# Patient Record
Sex: FEMALE | Race: WHITE | NOT HISPANIC OR LATINO
[De-identification: names, ages, dates, MRNs, and addresses within clinical notes are randomized per-mention and may not be internally consistent; named-entity substitution may affect disease eponyms.]

---

## 2017-01-24 ENCOUNTER — APPOINTMENT (OUTPATIENT)
Dept: INTERNAL MEDICINE | Facility: CLINIC | Age: 37
End: 2017-01-24

## 2017-01-24 VITALS
DIASTOLIC BLOOD PRESSURE: 70 MMHG | WEIGHT: 192 LBS | HEIGHT: 68 IN | OXYGEN SATURATION: 98 % | BODY MASS INDEX: 29.1 KG/M2 | TEMPERATURE: 98.2 F | RESPIRATION RATE: 14 BRPM | SYSTOLIC BLOOD PRESSURE: 116 MMHG | HEART RATE: 93 BPM

## 2017-02-20 ENCOUNTER — RX RENEWAL (OUTPATIENT)
Age: 37
End: 2017-02-20

## 2017-05-15 ENCOUNTER — MOBILE ON CALL (OUTPATIENT)
Age: 37
End: 2017-05-15

## 2017-08-14 ENCOUNTER — RX RENEWAL (OUTPATIENT)
Age: 37
End: 2017-08-14

## 2017-11-20 ENCOUNTER — RX RENEWAL (OUTPATIENT)
Age: 37
End: 2017-11-20

## 2017-12-17 ENCOUNTER — TRANSCRIPTION ENCOUNTER (OUTPATIENT)
Age: 37
End: 2017-12-17

## 2018-02-09 ENCOUNTER — RX RENEWAL (OUTPATIENT)
Age: 38
End: 2018-02-09

## 2018-05-07 ENCOUNTER — RX RENEWAL (OUTPATIENT)
Age: 38
End: 2018-05-07

## 2018-07-31 ENCOUNTER — RX RENEWAL (OUTPATIENT)
Age: 38
End: 2018-07-31

## 2018-10-24 ENCOUNTER — RX RENEWAL (OUTPATIENT)
Age: 38
End: 2018-10-24

## 2019-01-23 ENCOUNTER — RX RENEWAL (OUTPATIENT)
Age: 39
End: 2019-01-23

## 2019-01-23 ENCOUNTER — TRANSCRIPTION ENCOUNTER (OUTPATIENT)
Age: 39
End: 2019-01-23

## 2019-02-02 ENCOUNTER — TRANSCRIPTION ENCOUNTER (OUTPATIENT)
Age: 39
End: 2019-02-02

## 2019-04-15 ENCOUNTER — RX RENEWAL (OUTPATIENT)
Age: 39
End: 2019-04-15

## 2019-04-29 ENCOUNTER — APPOINTMENT (OUTPATIENT)
Dept: INTERNAL MEDICINE | Facility: CLINIC | Age: 39
End: 2019-04-29
Payer: COMMERCIAL

## 2019-04-29 VITALS
TEMPERATURE: 97.8 F | WEIGHT: 190 LBS | OXYGEN SATURATION: 98 % | SYSTOLIC BLOOD PRESSURE: 118 MMHG | RESPIRATION RATE: 16 BRPM | HEIGHT: 68 IN | HEART RATE: 62 BPM | BODY MASS INDEX: 28.79 KG/M2 | DIASTOLIC BLOOD PRESSURE: 80 MMHG

## 2019-04-29 DIAGNOSIS — Z82.49 FAMILY HISTORY OF ISCHEMIC HEART DISEASE AND OTHER DISEASES OF THE CIRCULATORY SYSTEM: ICD-10-CM

## 2019-04-29 DIAGNOSIS — Z80.3 FAMILY HISTORY OF MALIGNANT NEOPLASM OF BREAST: ICD-10-CM

## 2019-04-29 DIAGNOSIS — L08.9 SEBACEOUS CYST: ICD-10-CM

## 2019-04-29 DIAGNOSIS — Z80.1 FAMILY HISTORY OF MALIGNANT NEOPLASM OF TRACHEA, BRONCHUS AND LUNG: ICD-10-CM

## 2019-04-29 DIAGNOSIS — Z78.9 OTHER SPECIFIED HEALTH STATUS: ICD-10-CM

## 2019-04-29 DIAGNOSIS — Z80.0 FAMILY HISTORY OF MALIGNANT NEOPLASM OF DIGESTIVE ORGANS: ICD-10-CM

## 2019-04-29 DIAGNOSIS — L72.3 SEBACEOUS CYST: ICD-10-CM

## 2019-04-29 DIAGNOSIS — M25.50 PAIN IN UNSPECIFIED JOINT: ICD-10-CM

## 2019-04-29 DIAGNOSIS — Z87.09 PERSONAL HISTORY OF OTHER DISEASES OF THE RESPIRATORY SYSTEM: ICD-10-CM

## 2019-04-29 DIAGNOSIS — Z83.49 FAMILY HISTORY OF OTHER ENDOCRINE, NUTRITIONAL AND METABOLIC DISEASES: ICD-10-CM

## 2019-04-29 PROCEDURE — 99213 OFFICE O/P EST LOW 20 MIN: CPT

## 2019-04-29 RX ORDER — FEXOFENADINE HYDROCHLORIDE 180 MG/1
180 TABLET, FILM COATED ORAL DAILY
Qty: 30 | Refills: 0 | Status: ACTIVE | COMMUNITY
Start: 2019-04-29

## 2019-04-29 RX ORDER — AZITHROMYCIN 250 MG/1
250 TABLET, FILM COATED ORAL
Qty: 1 | Refills: 0 | Status: DISCONTINUED | COMMUNITY
Start: 2017-01-24 | End: 2019-04-29

## 2019-04-29 RX ORDER — BENZONATATE 200 MG/1
200 CAPSULE ORAL
Qty: 20 | Refills: 0 | Status: DISCONTINUED | COMMUNITY
Start: 2017-01-24 | End: 2019-04-29

## 2019-04-29 NOTE — PAST MEDICAL HISTORY
[Menstruating] : menstruating [Menarche Age ____] : age at menarche was [unfilled] [Definite ___ (Date)] : the last menstrual period was [unfilled] [Regular Cycle Intervals] : have been regular [Total Preg ___] : G[unfilled] [Live Births ___] : P[unfilled]  [Full Term ___] : Full Term: [unfilled] [Ectopics ___] : ectopic pregnancies: [unfilled]  [FreeTextEntry1] : IUD

## 2019-04-29 NOTE — PLAN
[FreeTextEntry1] : Gastroenterology.\par GERD. Continue with AcipHex 20 mg daily. Followup gastroenterology.\par Advise diet. Avoid large meals and lying down after eating.\par \par Endocrinology.\par Thyroid nodule. Followup with endocrinologist. Repeat thyroid sonogram to compare nodule sizes.\par \par Allergies. Continue with over-the-counter antihistamine

## 2019-04-29 NOTE — HEALTH RISK ASSESSMENT
[No falls in past year] : Patient reported no falls in the past year [0] : 2) Feeling down, depressed, or hopeless: Not at all (0) [] : No [de-identified] : Social [de-identified] : 2 x a week (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) [de-identified] : Regular  [[id number]] : 0

## 2019-04-29 NOTE — PHYSICAL EXAM
[No Acute Distress] : no acute distress [Well Nourished] : well nourished [Well Developed] : well developed [Well-Appearing] : well-appearing [Normal Sclera/Conjunctiva] : normal sclera/conjunctiva [PERRL] : pupils equal round and reactive to light [EOMI] : extraocular movements intact [Normal Outer Ear/Nose] : the outer ears and nose were normal in appearance [Normal Oropharynx] : the oropharynx was normal [No JVD] : no jugular venous distention [Supple] : supple [No Lymphadenopathy] : no lymphadenopathy [Thyroid Normal, No Nodules] : the thyroid was normal and there were no nodules present [No Respiratory Distress] : no respiratory distress  [Clear to Auscultation] : lungs were clear to auscultation bilaterally [No Accessory Muscle Use] : no accessory muscle use [Normal Rate] : normal rate  [Regular Rhythm] : with a regular rhythm [Normal S1, S2] : normal S1 and S2 [No Carotid Bruits] : no carotid bruits [No Abdominal Bruit] : a ~M bruit was not heard ~T in the abdomen [No Varicosities] : no varicosities [Pedal Pulses Present] : the pedal pulses are present [No Edema] : there was no peripheral edema [No Extremity Clubbing/Cyanosis] : no extremity clubbing/cyanosis [No Palpable Aorta] : no palpable aorta [Soft] : abdomen soft [Non Tender] : non-tender [Non-distended] : non-distended [No Masses] : no abdominal mass palpated [No HSM] : no HSM [Normal Bowel Sounds] : normal bowel sounds [Normal Posterior Cervical Nodes] : no posterior cervical lymphadenopathy [Normal Anterior Cervical Nodes] : no anterior cervical lymphadenopathy [No CVA Tenderness] : no CVA  tenderness [No Spinal Tenderness] : no spinal tenderness [No Joint Swelling] : no joint swelling [Grossly Normal Strength/Tone] : grossly normal strength/tone [No Rash] : no rash [Thin Hair] : thin hair [Normal Gait] : normal gait [Coordination Grossly Intact] : coordination grossly intact [No Focal Deficits] : no focal deficits [Deep Tendon Reflexes (DTR)] : deep tendon reflexes were 2+ and symmetric [Speech Grossly Normal] : speech grossly normal [Normal Affect] : the affect was normal [Alert and Oriented x3] : oriented to person, place, and time [Normal Mood] : the mood was normal [Normal Insight/Judgement] : insight and judgment were intact

## 2019-04-29 NOTE — HISTORY OF PRESENT ILLNESS
[FreeTextEntry1] : A 38-year-old female, who presents to the office for renewal of medication for GERD  [de-identified] : A 38-year-old female, with a past medical history as noted below, present to the office for renewal of medication.  Patient denies having chest pain or shortness of breath. Is taking generic AcipHex with good relief of heartburn. Patient denies side effects of medication. Also states she has vitamin D deficiency but has not been taking the vitamin D as directed. Has been seeing endocrinology for evaluation and followup of a thyroid nodule.

## 2019-04-29 NOTE — COUNSELING
[Weight management counseling provided] : Weight management [Healthy eating counseling provided] : healthy eating [Good understanding] : Patient has a good understanding of disease, goals and obesity follow-up plan [Low Fat Diet] : Low fat diet [Decrease Portions] : Decrease food portions [Walking] : Walking [de-identified] : Diet and exercise advised

## 2019-04-29 NOTE — ASSESSMENT
[FreeTextEntry1] : A 38-year-old female, with vitamin D deficiency, hypothyroidism, allergies and acid reflux

## 2019-05-28 ENCOUNTER — APPOINTMENT (OUTPATIENT)
Dept: INTERNAL MEDICINE | Facility: CLINIC | Age: 39
End: 2019-05-28

## 2019-06-06 ENCOUNTER — RX RENEWAL (OUTPATIENT)
Age: 39
End: 2019-06-06

## 2019-06-06 DIAGNOSIS — E56.9 VITAMIN DEFICIENCY, UNSPECIFIED: ICD-10-CM

## 2019-06-12 ENCOUNTER — RESULT CHARGE (OUTPATIENT)
Age: 39
End: 2019-06-12

## 2019-06-13 ENCOUNTER — TRANSCRIPTION ENCOUNTER (OUTPATIENT)
Age: 39
End: 2019-06-13

## 2019-06-13 ENCOUNTER — APPOINTMENT (OUTPATIENT)
Dept: INTERNAL MEDICINE | Facility: CLINIC | Age: 39
End: 2019-06-13
Payer: COMMERCIAL

## 2019-06-13 ENCOUNTER — NON-APPOINTMENT (OUTPATIENT)
Age: 39
End: 2019-06-13

## 2019-06-13 VITALS
SYSTOLIC BLOOD PRESSURE: 100 MMHG | OXYGEN SATURATION: 99 % | DIASTOLIC BLOOD PRESSURE: 82 MMHG | RESPIRATION RATE: 16 BRPM | TEMPERATURE: 98.7 F | WEIGHT: 193.31 LBS | HEART RATE: 72 BPM | HEIGHT: 68 IN | BODY MASS INDEX: 29.3 KG/M2

## 2019-06-13 LAB
25(OH)D3 SERPL-MCNC: 25.1 NG/ML
ALBUMIN SERPL ELPH-MCNC: 4.7 G/DL
ALP BLD-CCNC: 97 U/L
ALT SERPL-CCNC: 12 U/L
ANION GAP SERPL CALC-SCNC: 14 MMOL/L
AST SERPL-CCNC: 11 U/L
BASOPHILS # BLD AUTO: 0.07 K/UL
BASOPHILS NFR BLD AUTO: 0.7 %
BILIRUB SERPL-MCNC: 1 MG/DL
BUN SERPL-MCNC: 17 MG/DL
CALCIUM SERPL-MCNC: 9.9 MG/DL
CHLORIDE SERPL-SCNC: 104 MMOL/L
CHOLEST SERPL-MCNC: 164 MG/DL
CHOLEST/HDLC SERPL: 3.4 RATIO
CO2 SERPL-SCNC: 23 MMOL/L
CREAT SERPL-MCNC: 0.86 MG/DL
CRP SERPL-MCNC: 0.33 MG/DL
EOSINOPHIL # BLD AUTO: 0.17 K/UL
EOSINOPHIL NFR BLD AUTO: 1.7 %
ERYTHROCYTE [SEDIMENTATION RATE] IN BLOOD BY WESTERGREN METHOD: 9 MM/HR
ESTIMATED AVERAGE GLUCOSE: 111 MG/DL
GLUCOSE SERPL-MCNC: 98 MG/DL
HBA1C MFR BLD HPLC: 5.5 %
HCT VFR BLD CALC: 43.7 %
HDLC SERPL-MCNC: 48 MG/DL
HGB BLD-MCNC: 14.2 G/DL
IMM GRANULOCYTES NFR BLD AUTO: 0.6 %
LDLC SERPL CALC-MCNC: 86 MG/DL
LYMPHOCYTES # BLD AUTO: 3.1 K/UL
LYMPHOCYTES NFR BLD AUTO: 31.4 %
MAN DIFF?: NORMAL
MCHC RBC-ENTMCNC: 29 PG
MCHC RBC-ENTMCNC: 32.5 GM/DL
MCV RBC AUTO: 89.4 FL
MONOCYTES # BLD AUTO: 0.85 K/UL
MONOCYTES NFR BLD AUTO: 8.6 %
NEUTROPHILS # BLD AUTO: 5.63 K/UL
NEUTROPHILS NFR BLD AUTO: 57 %
PLATELET # BLD AUTO: 329 K/UL
POTASSIUM SERPL-SCNC: 4.5 MMOL/L
PROT SERPL-MCNC: 6.9 G/DL
RBC # BLD: 4.89 M/UL
RBC # FLD: 12.6 %
SODIUM SERPL-SCNC: 141 MMOL/L
T4 FREE SERPL-MCNC: 1.3 NG/DL
TRIGL SERPL-MCNC: 148 MG/DL
TSH SERPL-ACNC: 1.6 UIU/ML
VIT B12 SERPL-MCNC: 530 PG/ML
WBC # FLD AUTO: 9.88 K/UL

## 2019-06-13 PROCEDURE — 99395 PREV VISIT EST AGE 18-39: CPT | Mod: 25

## 2019-06-13 PROCEDURE — 93000 ELECTROCARDIOGRAM COMPLETE: CPT

## 2019-06-13 NOTE — PAST MEDICAL HISTORY
[Menstruating] : menstruating [Menarche Age ____] : age at menarche was [unfilled] [Definite ___ (Date)] : the last menstrual period was [unfilled] [Regular Cycle Intervals] : have been regular [Total Preg ___] : G[unfilled] [Full Term ___] : Full Term: [unfilled] [Live Births ___] : P[unfilled]  [Ectopics ___] : ectopic pregnancies: [unfilled]  [FreeTextEntry1] : IUD

## 2019-06-13 NOTE — PLAN
[FreeTextEntry1] : Cardiopulmonary  - Low HDL     We reviewed and discussed the EKG in full detail.  Reviewed lipid and CRP levels.   Advised what results represented.  Harmeet  was encourage to Continue with her diet and exercise program.   \par  Advised aerobic exercise can increase HDL levels.\par \par Gastroenterology.\par GERD. Continue with AcipHex 20 mg daily. Followup gastroenterology.\par Advise diet. Avoid large meals and lying down after eating.   Reviewed recent labs \par \par Endocrinology.\par Obesity-  Patient is currently on a diet and exercise program. Advised patient to continue with current therapy.\par Thyroid nodule. Followup with endocrinologist.Request copy of recent thyroid sonogram.  Follow up with endo in 9 months.  Recent TSH was wnl \par \par Allergies. Continue with over-the-counter antihistamine as needed prn.  \par \par Dermatology HARMEET was encouraged to wear sun protective clothing, sun block, and proper use of SPF board brim hats, to seek shade and to avoid the sun in peak hours.  ABCD of skin moles was advised. \par \par Discussed health care proxy.

## 2019-06-13 NOTE — ASSESSMENT
[FreeTextEntry1] : A 38-year-old female, with vitamin D deficiency, hypothyroidism, allergies and acid reflux who present to the office for a CPX.

## 2019-06-13 NOTE — HEALTH RISK ASSESSMENT
[No falls in past year] : Patient reported no falls in the past year [0] : 2) Feeling down, depressed, or hopeless: Not at all (0) [Patient reported PAP Smear was normal] : Patient reported PAP Smear was normal [Patient reported bone density results were normal] : Patient reported bone density results were normal [Patient reported colonoscopy was normal] : Patient reported colonoscopy was normal [# of Members in Household ___] :  household currently consist of [unfilled] member(s) [With Family] : lives with family [Employed] : employed [Graduate School] : graduate school [] :  [# Of Children ___] : has [unfilled] children [Sexually Active] : sexually active [Feels Safe at Home] : Feels safe at home [Fully functional (bathing, dressing, toileting, transferring, walking, feeding)] : Fully functional (bathing, dressing, toileting, transferring, walking, feeding) [Fully functional (using the telephone, shopping, preparing meals, housekeeping, doing laundry, using] : Fully functional and needs no help or supervision to perform IADLs (using the telephone, shopping, preparing meals, housekeeping, doing laundry, using transportation, managing medications and managing finances) [Reports changes in vision] : Reports changes in vision [Reports changes in dental health] : Reports changes in dental health [Smoke Detector] : smoke detector [Carbon Monoxide Detector] : carbon monoxide detector [Seat Belt] :  uses seat belt [Sunscreen] : uses sunscreen [No Retinopathy] : No retinopathy [Very Good] : ~his/her~ current health as very good [Good] : ~his/her~  mood as  good [] : No [de-identified] : Social [de-identified] : 2 x a week (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) [de-identified] : Regular  [[id number]] : 0 [Change in mental status noted] : No change in mental status noted [Language] : denies difficulty with language [Behavior] : denies difficulty with behavior [Learning/Retaining New Information] : denies difficulty learning/retaining new information [Handling Complex Tasks] : denies difficulty handling complex tasks [Reasoning] : denies difficulty with reasoning [Spatial Ability and Orientation] : denies difficulty with spatial ability and orientation [High Risk Behavior] : no high risk behavior [Reports changes in hearing] : Reports no changes in hearing [Reports normal functional visual acuity (ie: able to read med bottle)] : Reports poor functional visual acuity.  [Guns at Home] : no guns at home [Travel to Developing Areas] : does not  travel to developing areas [TB Exposure] : is not being exposed to tuberculosis [MammogramDate] : 06/19 [MammogramComments] : Had done this am  [PapearDate] : 04/30 [BoneDensityDate] : 01/18 [ColonoscopyDate] : 01/04 [ColonoscopyComments] : IBS  [HIVDate] : 01/15 [HIVComments] : Negative  During pregnancy  [FreeTextEntry2] :   [EyeExamDate] : 06/18

## 2019-06-13 NOTE — HISTORY OF PRESENT ILLNESS
[FreeTextEntry1] : A 38- year-old female, who presents to the office for a  complete physical examination. [de-identified] : A 38-year-old female, with a past medical history as noted below, presents to the office today for complete physical examination.  Patient states she had a mammogram done earlier this morning.  Also recently saw the endocrinologist and had a thyroid sonogram. Was advised to return to the office in 9 months for a repeat sonogram. Otherwise, patient states she is doing well with no significant new medical issues.  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

## 2019-06-13 NOTE — COUNSELING
[Healthy eating counseling provided] : healthy eating [Weight management counseling provided] : Weight management [Needs reinforcement, provided] : Patient needs reinforcement on understanding of disease, goals and obesity follow-up plan; reinforcement was provided [Low Fat Diet] : Low fat diet [Decrease Portions] : Decrease food portions [None] : None [Good understanding] : Patient has a good understanding of lifestyle changes and the steps needed to achieve self management goals [de-identified] : Diet and exercise advised \par Continue with 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

## 2019-06-13 NOTE — PHYSICAL EXAM
[No Acute Distress] : no acute distress [Well Nourished] : well nourished [Well Developed] : well developed [Well-Appearing] : well-appearing [Normal Sclera/Conjunctiva] : normal sclera/conjunctiva [PERRL] : pupils equal round and reactive to light [EOMI] : extraocular movements intact [Normal Oropharynx] : the oropharynx was normal [Normal Outer Ear/Nose] : the outer ears and nose were normal in appearance [No JVD] : no jugular venous distention [Supple] : supple [No Respiratory Distress] : no respiratory distress  [No Lymphadenopathy] : no lymphadenopathy [No Accessory Muscle Use] : no accessory muscle use [Clear to Auscultation] : lungs were clear to auscultation bilaterally [Normal Rate] : normal rate  [Regular Rhythm] : with a regular rhythm [No Carotid Bruits] : no carotid bruits [Normal S1, S2] : normal S1 and S2 [No Abdominal Bruit] : a ~M bruit was not heard ~T in the abdomen [Pedal Pulses Present] : the pedal pulses are present [No Varicosities] : no varicosities [No Edema] : there was no peripheral edema [No Extremity Clubbing/Cyanosis] : no extremity clubbing/cyanosis [Soft] : abdomen soft [No Palpable Aorta] : no palpable aorta [Non Tender] : non-tender [Non-distended] : non-distended [No HSM] : no HSM [No Masses] : no abdominal mass palpated [Normal Posterior Cervical Nodes] : no posterior cervical lymphadenopathy [Normal Bowel Sounds] : normal bowel sounds [No CVA Tenderness] : no CVA  tenderness [Normal Anterior Cervical Nodes] : no anterior cervical lymphadenopathy [No Spinal Tenderness] : no spinal tenderness [No Joint Swelling] : no joint swelling [Grossly Normal Strength/Tone] : grossly normal strength/tone [No Rash] : no rash [Thin Hair] : thin hair [Normal Gait] : normal gait [No Focal Deficits] : no focal deficits [Coordination Grossly Intact] : coordination grossly intact [Deep Tendon Reflexes (DTR)] : deep tendon reflexes were 2+ and symmetric [Normal Affect] : the affect was normal [Alert and Oriented x3] : oriented to person, place, and time [Speech Grossly Normal] : speech grossly normal [Normal Insight/Judgement] : insight and judgment were intact [Normal Mood] : the mood was normal [Normal Percussion] : the chest was normal to percussion [Normal TMs] : both tympanic membranes were normal [Normal Nasal Mucosa] : the nasal mucosa was normal [Memory Grossly Normal] : memory grossly normal [de-identified] : Thyroid nodule  [de-identified] : Multiple nevi upper ext b/l  [de-identified] : Negative Romberg

## 2019-10-29 ENCOUNTER — APPOINTMENT (OUTPATIENT)
Dept: INTERNAL MEDICINE | Facility: CLINIC | Age: 39
End: 2019-10-29
Payer: COMMERCIAL

## 2019-10-29 VITALS
OXYGEN SATURATION: 98 % | TEMPERATURE: 98.5 F | BODY MASS INDEX: 29.25 KG/M2 | HEART RATE: 83 BPM | DIASTOLIC BLOOD PRESSURE: 68 MMHG | RESPIRATION RATE: 16 BRPM | WEIGHT: 193 LBS | SYSTOLIC BLOOD PRESSURE: 112 MMHG | HEIGHT: 68 IN

## 2019-10-29 DIAGNOSIS — L30.9 DERMATITIS, UNSPECIFIED: ICD-10-CM

## 2019-10-29 DIAGNOSIS — Z87.09 PERSONAL HISTORY OF OTHER DISEASES OF THE RESPIRATORY SYSTEM: ICD-10-CM

## 2019-10-29 PROCEDURE — 99214 OFFICE O/P EST MOD 30 MIN: CPT

## 2019-10-29 RX ORDER — PROMETHAZINE HYDROCHLORIDE AND DEXTROMETHORPHAN HYDROBROMIDE ORAL SOLUTION 15; 6.25 MG/5ML; MG/5ML
6.25-15 SOLUTION ORAL EVERY 6 HOURS
Qty: 200 | Refills: 0 | Status: COMPLETED | COMMUNITY
Start: 2019-10-29 | End: 2019-11-08

## 2019-10-29 NOTE — PHYSICAL EXAM
[No Acute Distress] : no acute distress [Well Nourished] : well nourished [Well Developed] : well developed [Well-Appearing] : well-appearing [Normal Sclera/Conjunctiva] : normal sclera/conjunctiva [PERRL] : pupils equal round and reactive to light [EOMI] : extraocular movements intact [Normal Outer Ear/Nose] : the outer ears and nose were normal in appearance [Normal Oropharynx] : the oropharynx was normal [No JVD] : no jugular venous distention [Supple] : supple [No Lymphadenopathy] : no lymphadenopathy [Thyroid Normal, No Nodules] : the thyroid was normal and there were no nodules present [No Respiratory Distress] : no respiratory distress  [Clear to Auscultation] : lungs were clear to auscultation bilaterally [Normal Rate] : normal rate  [Regular Rhythm] : with a regular rhythm [Normal S1, S2] : normal S1 and S2 [No Carotid Bruits] : no carotid bruits [No Abdominal Bruit] : a ~M bruit was not heard ~T in the abdomen [Pedal Pulses Present] : the pedal pulses are present [No Edema] : there was no peripheral edema [No Extremity Clubbing/Cyanosis] : no extremity clubbing/cyanosis [No Palpable Aorta] : no palpable aorta [Soft] : abdomen soft [Non Tender] : non-tender [Non-distended] : non-distended [No Masses] : no abdominal mass palpated [No HSM] : no HSM [Normal Bowel Sounds] : normal bowel sounds [Normal Posterior Cervical Nodes] : no posterior cervical lymphadenopathy [Normal Anterior Cervical Nodes] : no anterior cervical lymphadenopathy [No CVA Tenderness] : no CVA  tenderness [No Spinal Tenderness] : no spinal tenderness [No Joint Swelling] : no joint swelling [Grossly Normal Strength/Tone] : grossly normal strength/tone [No Rash] : no rash [Thin Hair] : thin hair [Normal Gait] : normal gait [Coordination Grossly Intact] : coordination grossly intact [No Focal Deficits] : no focal deficits [Deep Tendon Reflexes (DTR)] : deep tendon reflexes were 2+ and symmetric [Speech Grossly Normal] : speech grossly normal [Normal Affect] : the affect was normal [Alert and Oriented x3] : oriented to person, place, and time [Normal Mood] : the mood was normal [Normal Insight/Judgement] : insight and judgment were intact [Obese] : obese [Striae] : striae were noted [de-identified] : Coarse breath sounds upper lobe b/l    no wheezing noted

## 2019-10-29 NOTE — HISTORY OF PRESENT ILLNESS
[Cold Symptoms] : cold symptoms [Moderate] : moderate [Gradual] : gradually [Constant] : constant [Congestion] : congestion [Shortness Of Breath] : shortness of breath [Fatigue] : fatigue [Worsening] : worsening [Cough] : no cough [Sore Throat] : no sore throat [Wheezing] : no wheezing [Chills] : no chills [Anorexia] : no anorexia [Earache] : no earache [Headache] : no headache [Fever] : no fever [FreeTextEntry8] : Allegra, zicam, Robitussin with slight relief.  States she still feels  constant post nasal drip \par \par Also has been suffering from IBS constipation and gerd.  States she would like to follow up with a gastro.\par States she tried multiple medications without any relief.

## 2019-10-29 NOTE — REVIEW OF SYSTEMS
[Fatigue] : fatigue [Postnasal Drip] : postnasal drip [Cough] : cough [Negative] : Heme/Lymph [de-identified] : dry patch of skin on eye lid

## 2019-10-29 NOTE — HEALTH RISK ASSESSMENT
[Monthly or less (1 pt)] : Monthly or less (1 point) [1 or 2 (0 pts)] : 1 or 2 (0 points) [Never (0 pts)] : Never (0 points) [No] : In the past 12 months have you used drugs other than those required for medical reasons? No [No falls in past year] : Patient reported no falls in the past year [0] : 2) Feeling down, depressed, or hopeless: Not at all (0) [] : No [de-identified] : Social [Audit-CScore] : 1 [de-identified] : 2 x a week (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) [de-identified] : Regular  [[id number]] : 0

## 2019-10-29 NOTE — COUNSELING
[Weight management counseling provided] : Weight management [Healthy eating counseling provided] : healthy eating [Needs reinforcement, provided] : Patient needs reinforcement on understanding of disease, goals and obesity follow-up plan; reinforcement was provided [Low Fat Diet] : Low fat diet [Decrease Portions] : Decrease food portions [de-identified] : Diet and exercise advised

## 2019-10-29 NOTE — PLAN
[FreeTextEntry1] : Gastroenterology.\par GERD. Continue with AcipHex 20 mg daily. Followup gastroenterology.\par Advise diet. Avoid large meals and lying down after eating. \par Constipation   Increase fluids and fiber in diet \par \par Endocrinology.\par Thyroid nodule. Followup with endocrinologist. Repeat thyroid sonogram to compare nodule sizes.\par \par Allergies. Continue with over-the-counter antihistamine\par \par Pulm URI   start z-bradly and phenergan DM.  If no change or getting worse to call the office.  \par \par Derm - Dermatitis left eye lid apply very small amount of desonide to the left upper lid.  Avoid getting cream in the eye.   Advised to apply daily and no more than 7 days \par \par We discussed the importance of healthy lifestyles which include exercise, weight control and good diet.\par Patient's questions were answered in full detail. Advise patient if any other concerns arise to please call office to have a discussion.

## 2019-11-03 ENCOUNTER — TRANSCRIPTION ENCOUNTER (OUTPATIENT)
Age: 39
End: 2019-11-03

## 2020-07-16 ENCOUNTER — RESULT CHARGE (OUTPATIENT)
Age: 40
End: 2020-07-16

## 2020-07-17 ENCOUNTER — LABORATORY RESULT (OUTPATIENT)
Age: 40
End: 2020-07-17

## 2020-07-17 ENCOUNTER — APPOINTMENT (OUTPATIENT)
Dept: INTERNAL MEDICINE | Facility: CLINIC | Age: 40
End: 2020-07-17
Payer: COMMERCIAL

## 2020-07-17 ENCOUNTER — NON-APPOINTMENT (OUTPATIENT)
Age: 40
End: 2020-07-17

## 2020-07-17 VITALS
OXYGEN SATURATION: 99 % | RESPIRATION RATE: 14 BRPM | WEIGHT: 196 LBS | HEART RATE: 71 BPM | DIASTOLIC BLOOD PRESSURE: 82 MMHG | BODY MASS INDEX: 29.7 KG/M2 | SYSTOLIC BLOOD PRESSURE: 106 MMHG | HEIGHT: 68 IN | TEMPERATURE: 98.1 F

## 2020-07-17 DIAGNOSIS — Z11.4 ENCOUNTER FOR SCREENING FOR HUMAN IMMUNODEFICIENCY VIRUS [HIV]: ICD-10-CM

## 2020-07-17 DIAGNOSIS — Z11.59 ENCOUNTER FOR SCREENING FOR OTHER VIRAL DISEASES: ICD-10-CM

## 2020-07-17 DIAGNOSIS — K58.1 IRRITABLE BOWEL SYNDROME WITH CONSTIPATION: ICD-10-CM

## 2020-07-17 DIAGNOSIS — J30.9 ALLERGIC RHINITIS, UNSPECIFIED: ICD-10-CM

## 2020-07-17 LAB
BILIRUB UR QL STRIP: NEGATIVE
CLARITY UR: CLEAR
COLLECTION METHOD: NORMAL
GLUCOSE UR-MCNC: NEGATIVE
HCG UR QL: 0.2 EU/DL
HGB UR QL STRIP.AUTO: NEGATIVE
KETONES UR-MCNC: NEGATIVE
LEUKOCYTE ESTERASE UR QL STRIP: NEGATIVE
NITRITE UR QL STRIP: NEGATIVE
PH UR STRIP: 8
PROT UR STRIP-MCNC: ABNORMAL
SP GR UR STRIP: 1.02

## 2020-07-17 PROCEDURE — 93000 ELECTROCARDIOGRAM COMPLETE: CPT | Mod: 59

## 2020-07-17 PROCEDURE — G0442 ANNUAL ALCOHOL SCREEN 15 MIN: CPT | Mod: NC

## 2020-07-17 PROCEDURE — 81003 URINALYSIS AUTO W/O SCOPE: CPT | Mod: QW

## 2020-07-17 PROCEDURE — 36415 COLL VENOUS BLD VENIPUNCTURE: CPT

## 2020-07-17 PROCEDURE — 99396 PREV VISIT EST AGE 40-64: CPT | Mod: 25

## 2020-07-17 RX ORDER — AZITHROMYCIN 250 MG/1
250 TABLET, FILM COATED ORAL
Qty: 1 | Refills: 1 | Status: DISCONTINUED | COMMUNITY
Start: 2019-10-29 | End: 2020-07-17

## 2020-07-17 RX ORDER — RABEPRAZOLE SODIUM 20 MG/1
20 TABLET, DELAYED RELEASE ORAL
Qty: 90 | Refills: 3 | Status: DISCONTINUED | COMMUNITY
Start: 2017-05-15 | End: 2020-07-17

## 2020-07-17 RX ORDER — DESONIDE 0.5 MG/G
0.05 CREAM TOPICAL TWICE DAILY
Qty: 1 | Refills: 0 | Status: DISCONTINUED | COMMUNITY
Start: 2019-10-29 | End: 2020-07-17

## 2020-07-17 RX ORDER — DEXLANSOPRAZOLE 60 MG/1
60 CAPSULE, DELAYED RELEASE ORAL
Refills: 0 | Status: ACTIVE | COMMUNITY
Start: 2020-07-17

## 2020-07-17 NOTE — HISTORY OF PRESENT ILLNESS
[FreeTextEntry1] : Physical    [de-identified] : Ms. ALAS is a 39 year  female, who present to the office for  physical\par HAd endoscopy was advised she might have Villasenor's esophagitis and celiac.  Was advised to genetic test for celiac and go on a gluten free diet.  Was advised to have a repeat endoscopy \par Since going on a gluten free diet IBS has been greatly improved.  \par \par States this year her allergies have been bad. On antihistamines and nasal spray

## 2020-07-17 NOTE — COUNSELING
[Weight management counseling provided] : Weight management [Needs reinforcement, provided] : Patient needs reinforcement on understanding of disease, goals and obesity follow-up plan; reinforcement was provided [Healthy eating counseling provided] : healthy eating [Low Fat Diet] : Low fat diet [Decrease Portions] : Decrease food portions [Benefits of weight loss discussed] : Benefits of weight loss discussed [Good understanding] : Patient has a good understanding of disease, goals and obesity follow-up plan [Encouraged to increase physical activity] : Encouraged to increase physical activity [AUDIT-C Screening administered and reviewed] : AUDIT-C Screening administered and reviewed [FreeTextEntry2] : GLUTEN FREE DIET  [de-identified] : Diet and exercise advised

## 2020-07-17 NOTE — PLAN
[FreeTextEntry1] : Gastroenterology.\par GERD. Continue with Dexilant  daily. Followup gastroenterology.  Continue gluten-free diet \par Advise diet. Avoid large meals and lying down after eating. \par Constipation   Increase fluids and fiber in diet \par celiac disorder vs sensitivity  check labs \par \par Endocrinology.\par Thyroid nodule. Followup with endocrinologist. check thyroid sonogram  .\par Adult BMI screening and followup:  The patient's BMI is about normal. Counseled patient regarding BMI, healthy eating, portion control and exercise importance of diet to maintain a healthy weight. \par Counseled patient on importance of exercise to maintain a healthy weight \par \par Allergies. Continue with over-the-counter antihistamine\par \par Dermatology HARMEET was encouraged to wear sun protective clothing, sun block, and proper use of SPF board brim hats, to seek shade and to avoid the sun in peak hours.  ABCD of skin moles was advised. \par States she goes for yearly body exams  \par \par We discussed the importance of healthy lifestyles which include exercise, weight control and good diet.\par Patient's questions were answered in full detail. Advise patient if any other concerns arise to please call office to have a discussion. \par \par Patient  education  - COVID-19   Counseling and education provided to the patient.  Advised sign and symptoms of the virus.  Advised contact precautions.  Educated patient on proper hand washing and to participate in social distancing. Check covid ab  \par \par Patient is in full awareness of the plan and agrees to it.  All pt question was answered.  \par \par  call office next week to review labs \par Follow up with GI

## 2020-07-17 NOTE — PHYSICAL EXAM
[No Acute Distress] : no acute distress [Well Nourished] : well nourished [Well Developed] : well developed [Well-Appearing] : well-appearing [Normal Sclera/Conjunctiva] : normal sclera/conjunctiva [PERRL] : pupils equal round and reactive to light [EOMI] : extraocular movements intact [Normal Outer Ear/Nose] : the outer ears and nose were normal in appearance [No JVD] : no jugular venous distention [Normal Oropharynx] : the oropharynx was normal [Supple] : supple [Thyroid Normal, No Nodules] : the thyroid was normal and there were no nodules present [No Lymphadenopathy] : no lymphadenopathy [Clear to Auscultation] : lungs were clear to auscultation bilaterally [No Respiratory Distress] : no respiratory distress  [Normal Rate] : normal rate  [Regular Rhythm] : with a regular rhythm [Normal S1, S2] : normal S1 and S2 [No Carotid Bruits] : no carotid bruits [Pedal Pulses Present] : the pedal pulses are present [No Abdominal Bruit] : a ~M bruit was not heard ~T in the abdomen [No Extremity Clubbing/Cyanosis] : no extremity clubbing/cyanosis [No Edema] : there was no peripheral edema [No Palpable Aorta] : no palpable aorta [Soft] : abdomen soft [Non Tender] : non-tender [No Masses] : no abdominal mass palpated [Non-distended] : non-distended [No HSM] : no HSM [Normal Bowel Sounds] : normal bowel sounds [Obese] : obese [Striae] : striae were noted [Normal Anterior Cervical Nodes] : no anterior cervical lymphadenopathy [Normal Posterior Cervical Nodes] : no posterior cervical lymphadenopathy [No CVA Tenderness] : no CVA  tenderness [No Spinal Tenderness] : no spinal tenderness [Grossly Normal Strength/Tone] : grossly normal strength/tone [No Joint Swelling] : no joint swelling [No Rash] : no rash [Thin Hair] : thin hair [Normal Gait] : normal gait [No Focal Deficits] : no focal deficits [Coordination Grossly Intact] : coordination grossly intact [Deep Tendon Reflexes (DTR)] : deep tendon reflexes were 2+ and symmetric [Speech Grossly Normal] : speech grossly normal [Normal Affect] : the affect was normal [Normal Mood] : the mood was normal [Alert and Oriented x3] : oriented to person, place, and time [Normal Insight/Judgement] : insight and judgment were intact [de-identified] : Coarse breath sounds upper lobe b/l    no wheezing noted

## 2020-07-17 NOTE — HEALTH RISK ASSESSMENT
[Very Good] : ~his/her~ current health as very good [Good] : ~his/her~  mood as  good [Yes] : Yes [Monthly or less (1 pt)] : Monthly or less (1 point) [1 or 2 (0 pts)] : 1 or 2 (0 points) [Never (0 pts)] : Never (0 points) [No] : In the past 12 months have you used drugs other than those required for medical reasons? No [No falls in past year] : Patient reported no falls in the past year [0] : 2) Feeling down, depressed, or hopeless: Not at all (0) [Patient reported PAP Smear was normal] : Patient reported PAP Smear was normal [HIV Test offered] : HIV Test offered [Hepatitis C test offered] : Hepatitis C test offered [With Family] : lives with family [# of Members in Household ___] :  household currently consist of [unfilled] member(s) [Employed] : employed [Graduate School] : graduate school [] :  [# Of Children ___] : has [unfilled] children [Sexually Active] : sexually active [Feels Safe at Home] : Feels safe at home [Fully functional (bathing, dressing, toileting, transferring, walking, feeding)] : Fully functional (bathing, dressing, toileting, transferring, walking, feeding) [Fully functional (using the telephone, shopping, preparing meals, housekeeping, doing laundry, using] : Fully functional and needs no help or supervision to perform IADLs (using the telephone, shopping, preparing meals, housekeeping, doing laundry, using transportation, managing medications and managing finances) [Reports normal functional visual acuity (ie: able to read med bottle)] : Reports normal functional visual acuity [Carbon Monoxide Detector] : carbon monoxide detector [Sunscreen] : uses sunscreen [Seat Belt] :  uses seat belt [Patient/Caregiver not ready to engage] : Patient/Caregiver not ready to engage [] : No [FreeTextEntry1] : GI issues [de-identified] : GI  -  [de-identified] : Social [de-identified] : Gluten free  [Audit-CScore] : 1 [de-identified] : 3 x a week  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

## 2020-07-17 NOTE — REVIEW OF SYSTEMS
[Fatigue] : fatigue [Postnasal Drip] : postnasal drip [Nasal Discharge] : nasal discharge [Recent Change In Weight] : ~T recent weight change [Negative] : Integumentary [Wheezing] : no wheezing [Shortness Of Breath] : no shortness of breath [Cough] : no cough [FreeTextEntry7] : raspy voice   [Heartburn] : no heartburn

## 2020-07-17 NOTE — ASSESSMENT
[FreeTextEntry1] : A 39-year-old female, with vitamin D deficiency, hypothyroidism, allergies and acid reflux present to the office for a physical

## 2020-07-22 LAB
25(OH)D3 SERPL-MCNC: 42.6 NG/ML
ALBUMIN SERPL ELPH-MCNC: 4.7 G/DL
ALP BLD-CCNC: 105 U/L
ALT SERPL-CCNC: 27 U/L
ANION GAP SERPL CALC-SCNC: 16 MMOL/L
AST SERPL-CCNC: 26 U/L
BASOPHILS # BLD AUTO: 0.06 K/UL
BASOPHILS NFR BLD AUTO: 0.7 %
BILIRUB SERPL-MCNC: 1 MG/DL
BUN SERPL-MCNC: 9 MG/DL
CALCIUM SERPL-MCNC: 9.8 MG/DL
CHLORIDE SERPL-SCNC: 103 MMOL/L
CHOLEST SERPL-MCNC: 183 MG/DL
CHOLEST/HDLC SERPL: 3.8 RATIO
CO2 SERPL-SCNC: 22 MMOL/L
CREAT SERPL-MCNC: 0.7 MG/DL
EOSINOPHIL # BLD AUTO: 0.16 K/UL
EOSINOPHIL NFR BLD AUTO: 2 %
GLUCOSE SERPL-MCNC: 99 MG/DL
HCT VFR BLD CALC: 47.9 %
HCV AB SER QL: NONREACTIVE
HCV S/CO RATIO: 0.17 S/CO
HDLC SERPL-MCNC: 48 MG/DL
HGB BLD-MCNC: 14.4 G/DL
HIV1+2 AB SPEC QL IA.RAPID: NONREACTIVE
IMM GRANULOCYTES NFR BLD AUTO: 0.7 %
LDLC SERPL CALC-MCNC: 102 MG/DL
LYMPHOCYTES # BLD AUTO: 2.65 K/UL
LYMPHOCYTES NFR BLD AUTO: 32.4 %
MAN DIFF?: NORMAL
MCHC RBC-ENTMCNC: 28.3 PG
MCHC RBC-ENTMCNC: 30.1 GM/DL
MCV RBC AUTO: 94.1 FL
MONOCYTES # BLD AUTO: 0.74 K/UL
MONOCYTES NFR BLD AUTO: 9 %
NEUTROPHILS # BLD AUTO: 4.52 K/UL
NEUTROPHILS NFR BLD AUTO: 55.2 %
PLATELET # BLD AUTO: 318 K/UL
POTASSIUM SERPL-SCNC: 4.5 MMOL/L
PROT SERPL-MCNC: 6.9 G/DL
RBC # BLD: 5.09 M/UL
RBC # FLD: 13.5 %
SARS-COV-2 IGG SERPL IA-ACNC: 0.08 INDEX
SARS-COV-2 IGG SERPL QL IA: NEGATIVE
SODIUM SERPL-SCNC: 141 MMOL/L
T4 FREE SERPL-MCNC: 1.1 NG/DL
TRIGL SERPL-MCNC: 164 MG/DL
TSH SERPL-ACNC: 0.71 UIU/ML
WBC # FLD AUTO: 8.19 K/UL

## 2020-08-02 LAB
ANNOTATION COMMENT IMP: NORMAL
CELIAC DISEASE INTERPRETATION: NORMAL
CELIAC GENE PAIRS PRESENT: NORMAL
DQ ALPHA 1: NORMAL
DQ BETA 1: NORMAL
HLA-DQ2: POSITIVE
HLA-DQ8 QL: NEGATIVE
IMMUNOGLOBULIN A (IGA): 178 MG/DL
REF LAB TEST METHOD: NORMAL

## 2020-12-21 PROBLEM — Z87.09 HISTORY OF UPPER RESPIRATORY INFECTION: Status: RESOLVED | Noted: 2019-10-29 | Resolved: 2020-12-21

## 2021-02-14 ENCOUNTER — TRANSCRIPTION ENCOUNTER (OUTPATIENT)
Age: 41
End: 2021-02-14

## 2021-05-09 ENCOUNTER — TRANSCRIPTION ENCOUNTER (OUTPATIENT)
Age: 41
End: 2021-05-09

## 2021-10-30 ENCOUNTER — TRANSCRIPTION ENCOUNTER (OUTPATIENT)
Age: 41
End: 2021-10-30

## 2021-11-11 ENCOUNTER — APPOINTMENT (OUTPATIENT)
Dept: INTERNAL MEDICINE | Facility: CLINIC | Age: 41
End: 2021-11-11
Payer: COMMERCIAL

## 2021-11-11 VITALS
WEIGHT: 199 LBS | HEIGHT: 68 IN | TEMPERATURE: 36.6 F | RESPIRATION RATE: 16 BRPM | SYSTOLIC BLOOD PRESSURE: 124 MMHG | DIASTOLIC BLOOD PRESSURE: 88 MMHG | HEART RATE: 93 BPM | BODY MASS INDEX: 30.16 KG/M2 | OXYGEN SATURATION: 99 %

## 2021-11-11 PROCEDURE — 93000 ELECTROCARDIOGRAM COMPLETE: CPT | Mod: 59

## 2021-11-11 PROCEDURE — 36415 COLL VENOUS BLD VENIPUNCTURE: CPT

## 2021-11-11 PROCEDURE — 99396 PREV VISIT EST AGE 40-64: CPT | Mod: 25

## 2021-11-11 PROCEDURE — G0442 ANNUAL ALCOHOL SCREEN 15 MIN: CPT | Mod: NC

## 2021-11-11 NOTE — COUNSELING
[AUDIT-C Screening administered and reviewed] : AUDIT-C Screening administered and reviewed [Benefits of weight loss discussed] : Benefits of weight loss discussed [Encouraged to increase physical activity] : Encouraged to increase physical activity [Good understanding] : Patient has a good understanding of disease, goals and obesity follow-up plan [Weight management counseling provided] : Weight management [Healthy eating counseling provided] : healthy eating [Needs reinforcement, provided] : Patient needs reinforcement on understanding of disease, goals and obesity follow-up plan; reinforcement was provided [Low Fat Diet] : Low fat diet [Decrease Portions] : Decrease food portions [FreeTextEntry2] : GLUTEN FREE DIET  [de-identified] : Diet and exercise advised

## 2021-11-11 NOTE — REVIEW OF SYSTEMS
[Postnasal Drip] : postnasal drip [Back Pain] : back pain [Negative] : Psychiatric [Fever] : no fever [Chills] : no chills [Fatigue] : no fatigue [Recent Change In Weight] : ~T no recent weight change [Pain] : no pain [Redness] : no redness [Vision Problems] : no vision problems [Itching] : no itching [Earache] : no earache [Nasal Discharge] : no nasal discharge [Sore Throat] : no sore throat [Chest Pain] : no chest pain [Palpitations] : no palpitations [Leg Claudication] : no leg claudication [Lower Ext Edema] : no lower extremity edema [Shortness Of Breath] : no shortness of breath [Wheezing] : no wheezing [Cough] : no cough [Dyspnea on Exertion] : no dyspnea on exertion [Abdominal Pain] : no abdominal pain [Nausea] : no nausea [Constipation] : no constipation [Diarrhea] : diarrhea [Vomiting] : no vomiting [Heartburn] : no heartburn [Melena] : no melena [Dysuria] : no dysuria [Hematuria] : no hematuria [Headache] : no headache [Dizziness] : no dizziness [Memory Loss] : no memory loss [Easy Bleeding] : no easy bleeding [Easy Bruising] : no easy bruising [Swollen Glands] : no swollen glands [FreeTextEntry2] : weight gain

## 2021-11-11 NOTE — PHYSICAL EXAM
[No Acute Distress] : no acute distress [Well Nourished] : well nourished [Well Developed] : well developed [Well-Appearing] : well-appearing [Normal Sclera/Conjunctiva] : normal sclera/conjunctiva [PERRL] : pupils equal round and reactive to light [EOMI] : extraocular movements intact [Normal Outer Ear/Nose] : the outer ears and nose were normal in appearance [Normal Oropharynx] : the oropharynx was normal [No JVD] : no jugular venous distention [Supple] : supple [No Lymphadenopathy] : no lymphadenopathy [No Respiratory Distress] : no respiratory distress  [Clear to Auscultation] : lungs were clear to auscultation bilaterally [Normal Rate] : normal rate  [Regular Rhythm] : with a regular rhythm [Normal S1, S2] : normal S1 and S2 [No Carotid Bruits] : no carotid bruits [No Abdominal Bruit] : a ~M bruit was not heard ~T in the abdomen [Pedal Pulses Present] : the pedal pulses are present [No Edema] : there was no peripheral edema [No Extremity Clubbing/Cyanosis] : no extremity clubbing/cyanosis [No Palpable Aorta] : no palpable aorta [Soft] : abdomen soft [Non Tender] : non-tender [Non-distended] : non-distended [No Masses] : no abdominal mass palpated [No HSM] : no HSM [Normal Bowel Sounds] : normal bowel sounds [Obese] : obese [Striae] : striae were noted [Normal Posterior Cervical Nodes] : no posterior cervical lymphadenopathy [Normal Anterior Cervical Nodes] : no anterior cervical lymphadenopathy [No CVA Tenderness] : no CVA  tenderness [No Spinal Tenderness] : no spinal tenderness [No Joint Swelling] : no joint swelling [Grossly Normal Strength/Tone] : grossly normal strength/tone [No Rash] : no rash [Thin Hair] : thin hair [Normal Gait] : normal gait [Coordination Grossly Intact] : coordination grossly intact [No Focal Deficits] : no focal deficits [Deep Tendon Reflexes (DTR)] : deep tendon reflexes were 2+ and symmetric [Speech Grossly Normal] : speech grossly normal [Normal Affect] : the affect was normal [Alert and Oriented x3] : oriented to person, place, and time [Normal Mood] : the mood was normal [Normal Insight/Judgement] : insight and judgment were intact [Normal TMs] : both tympanic membranes were normal [Normal Nasal Mucosa] : the nasal mucosa was normal [de-identified] : with murmur  [de-identified] : as per gyn  [de-identified] : multiple nevi on the back

## 2021-11-11 NOTE — HISTORY OF PRESENT ILLNESS
[FreeTextEntry1] : Physical exam [de-identified] : Mrs. ALAS is a 40 year  female, who present to the office for  For A PHYSICAL EXAM \par States she has been under a lot of stress secondary to work related issues and the pandemic.\par Weight gain and the struggle to lose weight.  Not really diet and exercising\par Going for an eye exam and needs to follow up with dermatologist\par Recently saw orthopedic for back pain - seems to be getting better \par

## 2021-11-11 NOTE — HEALTH RISK ASSESSMENT
[Good] : ~his/her~  mood as  good [Yes] : Yes [1 or 2 (0 pts)] : 1 or 2 (0 points) [Never (0 pts)] : Never (0 points) [No] : In the past 12 months have you used drugs other than those required for medical reasons? No [No falls in past year] : Patient reported no falls in the past year [0] : 2) Feeling down, depressed, or hopeless: Not at all (0) [PHQ-2 Negative - No further assessment needed] : PHQ-2 Negative - No further assessment needed [No Retinopathy] : No retinopathy [Patient reported mammogram was normal] : Patient reported mammogram was normal [Patient reported PAP Smear was normal] : Patient reported PAP Smear was normal [Patient reported colonoscopy was normal] : Patient reported colonoscopy was normal [HIV Test offered] : HIV Test offered [Hepatitis C test offered] : Hepatitis C test offered [With Family] : lives with family [# of Members in Household ___] :  household currently consist of [unfilled] member(s) [Employed] : employed [Graduate School] : graduate school [] :  [# Of Children ___] : has [unfilled] children [Sexually Active] : sexually active [Feels Safe at Home] : Feels safe at home [Fully functional (bathing, dressing, toileting, transferring, walking, feeding)] : Fully functional (bathing, dressing, toileting, transferring, walking, feeding) [Fully functional (using the telephone, shopping, preparing meals, housekeeping, doing laundry, using] : Fully functional and needs no help or supervision to perform IADLs (using the telephone, shopping, preparing meals, housekeeping, doing laundry, using transportation, managing medications and managing finances) [Reports normal functional visual acuity (ie: able to read med bottle)] : Reports normal functional visual acuity [Smoke Detector] : smoke detector [Carbon Monoxide Detector] : carbon monoxide detector [Seat Belt] :  uses seat belt [Sunscreen] : uses sunscreen [Patient/Caregiver unclear of wishes] : , patient/caregiver unclear of wishes [2 - 4 times a month (2 pts)] : 2-4 times a month (2 points) [FreeTextEntry1] : STRESS  [] : No [de-identified] : Orthopedic  [de-identified] : Social [Audit-CScore] : 2 [de-identified] : no exercise  [de-identified] : Gluten free  [[id number]] : 0 [EyeExamDate] : 08/20 [Reports changes in hearing] : Reports no changes in hearing [Reports changes in vision] : Reports no changes in vision [MammogramDate] : 08/21 [MammogramComments] : Mammo and sono  normal as  per pt  [PapSmearDate] : 08/21 [PapSmearComments] : schedule 08/20  [facility] Dr. 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

## 2021-11-11 NOTE — PLAN
[FreeTextEntry1] : Cardiology - screening for CAD - Stress - Reviewed EKG slight change noted \par Referred to cardio for evaluation and echo.\par Check FLP\par \par  Gastroenterology.\par GERD. Continue with Dexilant  daily. Followup gastroenterology.  Continue gluten-free diet \par Advise diet. Avoid large meals and lying down after eating. \par Constipation   Increase fluids and fiber in diet \par  \par Endocrinology.\par Thyroid nodule. Followup with endocrinologist. States she had a recent sonogram from Dr. santiago office and no changes noted - will request a copy of the consult and report \par \par Adult BMI screening and followup:  The patient's BMI is about 30 . Counseled patient regarding BMI, healthy eating, portion control and exercise importance of diet to maintain a healthy weight. \par Counseled patient on importance of exercise to maintain a healthy weight \par Advised 1500 damaris diet \par Allergies. Continue with over-the-counter antihistamine\par \par Dermatology HARMEET was encouraged to wear sun protective clothing, sun block, and proper use of SPF board brim hats, to seek shade and to avoid the sun in peak hours.  ABCD of skin moles was advised. \par States she goes for yearly body exams  \par Advised to get yearly exam \par \par We discussed the importance of healthy lifestyles which include exercise, weight control and good diet.\par Patient's questions were answered in full detail. Advise patient if any other concerns arise to please call office to have a discussion. \par \par Patient  education  - COVID-19   Counseling and education provided to the patient.  Advised sign and symptoms of the virus.  Advised contact precautions.  Educated patient on proper hand washing and to participate in social distancing.  Advised to get covid booster \par \par Patient is in full awareness of the plan and agrees to it.  All pt question was answered.  \par \par Will damaris and try to get consult reports from GI- GYN, and endo

## 2021-11-22 ENCOUNTER — NON-APPOINTMENT (OUTPATIENT)
Age: 41
End: 2021-11-22

## 2021-11-23 LAB
25(OH)D3 SERPL-MCNC: 33.3 NG/ML
ALBUMIN SERPL ELPH-MCNC: 4.8 G/DL
ALP BLD-CCNC: 116 U/L
ALT SERPL-CCNC: 19 U/L
ANION GAP SERPL CALC-SCNC: 16 MMOL/L
AST SERPL-CCNC: 22 U/L
BASOPHILS # BLD AUTO: 0.07 K/UL
BASOPHILS NFR BLD AUTO: 0.8 %
BILIRUB SERPL-MCNC: 1 MG/DL
BUN SERPL-MCNC: 11 MG/DL
CALCIUM SERPL-MCNC: 10.1 MG/DL
CHLORIDE SERPL-SCNC: 104 MMOL/L
CHOLEST SERPL-MCNC: 181 MG/DL
CO2 SERPL-SCNC: 22 MMOL/L
CREAT SERPL-MCNC: 0.72 MG/DL
EOSINOPHIL # BLD AUTO: 0.16 K/UL
EOSINOPHIL NFR BLD AUTO: 1.8 %
ESTIMATED AVERAGE GLUCOSE: 114 MG/DL
GLUCOSE SERPL-MCNC: 90 MG/DL
HBA1C MFR BLD HPLC: 5.6 %
HCT VFR BLD CALC: 50.3 %
HDLC SERPL-MCNC: 48 MG/DL
HGB BLD-MCNC: 15.6 G/DL
IMM GRANULOCYTES NFR BLD AUTO: 0.7 %
LDLC SERPL CALC-MCNC: 101 MG/DL
LYMPHOCYTES # BLD AUTO: 2.65 K/UL
LYMPHOCYTES NFR BLD AUTO: 29.8 %
MAN DIFF?: NORMAL
MCHC RBC-ENTMCNC: 29.3 PG
MCHC RBC-ENTMCNC: 31 GM/DL
MCV RBC AUTO: 94.4 FL
MONOCYTES # BLD AUTO: 0.68 K/UL
MONOCYTES NFR BLD AUTO: 7.6 %
NEUTROPHILS # BLD AUTO: 5.27 K/UL
NEUTROPHILS NFR BLD AUTO: 59.3 %
NONHDLC SERPL-MCNC: 133 MG/DL
PLATELET # BLD AUTO: 345 K/UL
POTASSIUM SERPL-SCNC: 5.2 MMOL/L
PROT SERPL-MCNC: 7.2 G/DL
RBC # BLD: 5.33 M/UL
RBC # FLD: 13 %
SODIUM SERPL-SCNC: 141 MMOL/L
T4 FREE SERPL-MCNC: 1.3 NG/DL
TRIGL SERPL-MCNC: 161 MG/DL
TSH SERPL-ACNC: 0.63 UIU/ML
WBC # FLD AUTO: 8.89 K/UL

## 2021-11-30 RX ORDER — ERGOCALCIFEROL 1.25 MG/1
1.25 MG CAPSULE, LIQUID FILLED ORAL
Qty: 4 | Refills: 2 | Status: DISCONTINUED | COMMUNITY
Start: 2021-11-29 | End: 2021-11-30

## 2022-01-10 ENCOUNTER — APPOINTMENT (OUTPATIENT)
Dept: CARDIOLOGY | Facility: CLINIC | Age: 42
End: 2022-01-10
Payer: COMMERCIAL

## 2022-01-10 ENCOUNTER — NON-APPOINTMENT (OUTPATIENT)
Age: 42
End: 2022-01-10

## 2022-01-10 VITALS — OXYGEN SATURATION: 98 % | SYSTOLIC BLOOD PRESSURE: 116 MMHG | DIASTOLIC BLOOD PRESSURE: 79 MMHG | HEART RATE: 72 BPM

## 2022-01-10 PROCEDURE — 99204 OFFICE O/P NEW MOD 45 MIN: CPT

## 2022-01-10 PROCEDURE — 93000 ELECTROCARDIOGRAM COMPLETE: CPT

## 2022-01-11 ENCOUNTER — RESULT CHARGE (OUTPATIENT)
Age: 42
End: 2022-01-11

## 2022-01-12 ENCOUNTER — NON-APPOINTMENT (OUTPATIENT)
Age: 42
End: 2022-01-12

## 2022-01-19 ENCOUNTER — APPOINTMENT (OUTPATIENT)
Dept: CARDIOLOGY | Facility: CLINIC | Age: 42
End: 2022-01-19
Payer: COMMERCIAL

## 2022-01-19 PROCEDURE — 93306 TTE W/DOPPLER COMPLETE: CPT

## 2022-11-21 ENCOUNTER — NON-APPOINTMENT (OUTPATIENT)
Age: 42
End: 2022-11-21

## 2022-11-21 ENCOUNTER — APPOINTMENT (OUTPATIENT)
Dept: INTERNAL MEDICINE | Facility: CLINIC | Age: 42
End: 2022-11-21

## 2022-11-21 VITALS
TEMPERATURE: 97.3 F | HEART RATE: 87 BPM | WEIGHT: 203.25 LBS | HEIGHT: 68 IN | SYSTOLIC BLOOD PRESSURE: 122 MMHG | RESPIRATION RATE: 16 BRPM | BODY MASS INDEX: 30.8 KG/M2 | DIASTOLIC BLOOD PRESSURE: 80 MMHG | OXYGEN SATURATION: 98 %

## 2022-11-21 DIAGNOSIS — Z82.62 FAMILY HISTORY OF OSTEOPOROSIS: ICD-10-CM

## 2022-11-21 LAB
BILIRUB UR QL STRIP: NEGATIVE
CLARITY UR: CLEAR
COLLECTION METHOD: NORMAL
GLUCOSE UR-MCNC: NEGATIVE
HCG UR QL: 0.2 EU/DL
HGB UR QL STRIP.AUTO: NEGATIVE
KETONES UR-MCNC: NEGATIVE
LEUKOCYTE ESTERASE UR QL STRIP: NEGATIVE
NITRITE UR QL STRIP: NEGATIVE
PH UR STRIP: 5.5
PROT UR STRIP-MCNC: NEGATIVE
SP GR UR STRIP: 1.02

## 2022-11-21 PROCEDURE — 36415 COLL VENOUS BLD VENIPUNCTURE: CPT

## 2022-11-21 PROCEDURE — 99396 PREV VISIT EST AGE 40-64: CPT | Mod: 25

## 2022-11-21 PROCEDURE — 81003 URINALYSIS AUTO W/O SCOPE: CPT | Mod: QW

## 2022-11-21 PROCEDURE — 93000 ELECTROCARDIOGRAM COMPLETE: CPT | Mod: 59

## 2022-11-21 RX ORDER — COLD-HOT PACK
125 MCG EACH MISCELLANEOUS
Qty: 90 | Refills: 0 | Status: DISCONTINUED | OUTPATIENT
Start: 2021-11-30 | End: 2022-11-21

## 2022-11-22 NOTE — PLAN
[FreeTextEntry1] : Cardiology - screening for CAD -  reviewed EKG, also reviewed echocardiogram that was done in January 2022.  Discussed the importance of weight loss.  Also discussed the importance of anticipate an exercise program.  Check fasting lipid panel\par \par Gastroenterology.\par GERD. Continue with Dexilant  daily. Followup gastroenterology.  Continue gluten-free diet \par Advise diet. Avoid large meals and lying down after eating. \par History ofConstipation   Increase fluids and fiber in diet \par  \par Endocrinology.\par Thyroid nodule. Followup with endocrinologist. States she had a recent sonogram from Dr. santiago office  showed no acute changes in her thyroid nodules and does not really require any other follow-up.  Check TSH free T4.\par \par Adult BMI screening and followup:  The patient's BMI is about 30 .9. Counseled patient regarding BMI, healthy eating, portion control and exercise importance of diet to maintain a healthy weight. \par Counseled patient on importance of exercise to maintain a healthy weight.  Discussed the use of  Ozempic for weight loss.\par Advised 1500 damaris diet \par Allergies. Continue with over-the-counter antihistamine.\par   Check A1c, TSH today\par \par Dermatology HARMEET was encouraged to wear sun protective clothing, sun block, and proper use of SPF board brim hats, to seek shade and to avoid the sun in peak hours.  ABCD of skin moles was advised. \par States she goes for yearly body exams  \par Advised to get yearly exam \par \par Gynecology-advised to follow-up with gynecologist for routine Pap smear/pelvic exam.  Also advised yearly clinical breast exam as well as mammogram.  Advised self breast exam if notice any changes in breast tissue   she should have it evaluated by a medical provider. \par We discussed the importance of healthy lifestyles which include exercise, weight control and good diet.\par Patient's questions were answered in full detail. Advise patient if any other concerns arise to please call office to have a discussion. \par \par Patient  education  - COVID-19   Counseling and education provided to the patient.  Advised sign and symptoms of the virus.  Advised contact precautions.  Educated patient on proper hand washing and to participate in social distancing.  Advised to get covid booster  to cover the omicron variant\par \par Patient is in full awareness of the plan and agrees to it.  All pt question was answered.  \par \par

## 2022-11-22 NOTE — COUNSELING
[AUDIT-C Screening administered and reviewed] : AUDIT-C Screening administered and reviewed [Benefits of weight loss discussed] : Benefits of weight loss discussed [Encouraged to increase physical activity] : Encouraged to increase physical activity [Weight management counseling provided] : Weight management [Healthy eating counseling provided] : healthy eating [Needs reinforcement, provided] : Patient needs reinforcement on understanding of disease, goals and obesity follow-up plan; reinforcement was provided [Low Fat Diet] : Low fat diet [Decrease Portions] : Decrease food portions [Behavioral health counseling provided] : Behavioral health counseling provided [Engage in a relaxing activity] : Engage in a relaxing activity [Yes] : Risk of tobacco use and health benefits of smoking cessation discussed: Yes [Good understanding] : Patient has a good understanding of disease, goals and obesity follow-up plan [FreeTextEntry2] : GLUTEN FREE DIET  [de-identified] : Diet and exercise advised

## 2022-11-22 NOTE — REVIEW OF SYSTEMS
[Fatigue] : fatigue [Recent Change In Weight] : ~T recent weight change [Vision Problems] : vision problems [Incontinence] : incontinence [Negative] : Musculoskeletal [Fever] : no fever [Chills] : no chills [Night Sweats] : no night sweats [Pain] : no pain [Redness] : no redness [Itching] : no itching [Earache] : no earache [Nosebleed] : no nosebleeds [Nasal Discharge] : no nasal discharge [Sore Throat] : no sore throat [Postnasal Drip] : no postnasal drip [Chest Pain] : no chest pain [Palpitations] : no palpitations [Leg Claudication] : no leg claudication [Lower Ext Edema] : no lower extremity edema [Shortness Of Breath] : no shortness of breath [Wheezing] : no wheezing [Cough] : no cough [Dyspnea on Exertion] : no dyspnea on exertion [Abdominal Pain] : no abdominal pain [Nausea] : no nausea [Constipation] : no constipation [Diarrhea] : diarrhea [Vomiting] : no vomiting [Heartburn] : no heartburn [Melena] : no melena [Dysuria] : no dysuria [Hematuria] : no hematuria [Frequency] : no frequency [Joint Pain] : no joint pain [Muscle Pain] : no muscle pain [Back Pain] : no back pain [Itching] : no itching [Skin Rash] : no skin rash [Headache] : no headache [Dizziness] : no dizziness [Memory Loss] : no memory loss [Insomnia] : no insomnia [Easy Bleeding] : no easy bleeding [Easy Bruising] : no easy bruising [Swollen Glands] : no swollen glands [FreeTextEntry2] : weight gain  [FreeTextEntry9] : shoulder

## 2022-11-22 NOTE — HISTORY OF PRESENT ILLNESS
[FreeTextEntry1] : Physical Therapy [de-identified] : Mrs. ALAS is a 41 year  female, with a past medical history as noted below,who present to the office  today for physical exam.  Patient states since the pandemic she stopped exercising and dieting which is related to her  recent weight gain.   Patient states that she is frustrated with dieting because she very strict  for a few weeks and not lose a pound.  At that point loses interest in dieting and goes back to her normal limits.\par States she did join a gym but should be opening up December and January

## 2022-11-22 NOTE — HEALTH RISK ASSESSMENT
[Fair] : ~his/her~ current health as fair  [Good] : ~his/her~  mood as  good [Never] : Never [Yes] : Yes [Monthly or less (1 pt)] : Monthly or less (1 point) [1 or 2 (0 pts)] : 1 or 2 (0 points) [Never (0 pts)] : Never (0 points) [No] : In the past 12 months have you used drugs other than those required for medical reasons? No [No falls in past year] : Patient reported no falls in the past year [0] : 1) Little interest or pleasure doing things: Not at all (0) [1] : 2) Feeling down, depressed, or hopeless for several days (1) [PHQ-2 Negative - No further assessment needed] : PHQ-2 Negative - No further assessment needed [No Retinopathy] : No retinopathy [Patient reported PAP Smear was normal] : Patient reported PAP Smear was normal [None] : None [Employed] : employed [Graduate School] : graduate school [] :  [# Of Children ___] : has [unfilled] children [Sexually Active] : sexually active [Feels Safe at Home] : Feels safe at home [Reports normal functional visual acuity (ie: able to read med bottle)] : Reports normal functional visual acuity [Smoke Detector] : smoke detector [Carbon Monoxide Detector] : carbon monoxide detector [Seat Belt] :  uses seat belt [FreeTextEntry1] :  weight related issues [de-identified] : Orthopedic, Dr Pritchett,  GYN  [de-identified] : Social [Audit-CScore] : 2 [de-identified] : no exercise  [de-identified] : Gluten free  [[id number]] : 1 [EyeExamDate] : 12/21 [Reports changes in vision] : Reports no changes in vision [MammogramDate] : 10/22 [MammogramComments] : nika mammo and sono birad 1 [PapSmearDate] : 08/22 [PapSmearComments] : Dr. Cerna -    normal pap as per pt  [ColonoscopyDate] : 02/20 [HIVDate] : 07/20 [HIVComments] : negative [HepatitisCDate] : 07/20 [HepatitisCComments] : negative

## 2022-11-22 NOTE — PHYSICAL EXAM
[No Acute Distress] : no acute distress [Well Nourished] : well nourished [Well Developed] : well developed [Well-Appearing] : well-appearing [Normal Sclera/Conjunctiva] : normal sclera/conjunctiva [PERRL] : pupils equal round and reactive to light [EOMI] : extraocular movements intact [Normal Outer Ear/Nose] : the outer ears and nose were normal in appearance [Normal Oropharynx] : the oropharynx was normal [Normal TMs] : both tympanic membranes were normal [Normal Nasal Mucosa] : the nasal mucosa was normal [No JVD] : no jugular venous distention [Supple] : supple [No Lymphadenopathy] : no lymphadenopathy [No Respiratory Distress] : no respiratory distress  [Clear to Auscultation] : lungs were clear to auscultation bilaterally [Normal Rate] : normal rate  [Regular Rhythm] : with a regular rhythm [Normal S1, S2] : normal S1 and S2 [No Carotid Bruits] : no carotid bruits [No Abdominal Bruit] : a ~M bruit was not heard ~T in the abdomen [Pedal Pulses Present] : the pedal pulses are present [No Edema] : there was no peripheral edema [No Extremity Clubbing/Cyanosis] : no extremity clubbing/cyanosis [No Palpable Aorta] : no palpable aorta [Soft] : abdomen soft [Non Tender] : non-tender [Non-distended] : non-distended [No Masses] : no abdominal mass palpated [No HSM] : no HSM [Normal Bowel Sounds] : normal bowel sounds [Obese] : obese [Striae] : striae were noted [Normal Posterior Cervical Nodes] : no posterior cervical lymphadenopathy [Normal Anterior Cervical Nodes] : no anterior cervical lymphadenopathy [No CVA Tenderness] : no CVA  tenderness [No Spinal Tenderness] : no spinal tenderness [No Joint Swelling] : no joint swelling [Grossly Normal Strength/Tone] : grossly normal strength/tone [No Rash] : no rash [Thin Hair] : thin hair [Normal Gait] : normal gait [Coordination Grossly Intact] : coordination grossly intact [No Focal Deficits] : no focal deficits [Deep Tendon Reflexes (DTR)] : deep tendon reflexes were 2+ and symmetric [Speech Grossly Normal] : speech grossly normal [Normal Affect] : the affect was normal [Alert and Oriented x3] : oriented to person, place, and time [Normal Mood] : the mood was normal [Normal Insight/Judgement] : insight and judgment were intact [de-identified] : with murmur  [de-identified] :  as per gynecology [FreeTextEntry1] :  as per gynecology [de-identified] :  as per gynecology [de-identified] : multiple nevi on the back

## 2022-11-22 NOTE — DATA REVIEWED
[FreeTextEntry1] : EKG -Sinus rhythm \par Reviewed echocardiogram January 2022 done by Dr. Pritchett\par Reviewed mammogram and breast sonogram from 10/22.  see scanned for full detail\par  4 pound weight gain since last visit

## 2022-11-22 NOTE — PAST MEDICAL HISTORY
[Menstruating] : menstruating [Menarche Age ____] : age at menarche was [unfilled] [Approximately ___] : the LMP was approximately [unfilled] [Regular Cycle Intervals] : have been regular [Total Preg ___] : G[unfilled] [Live Births ___] : P[unfilled]  [Full Term ___] : Full Term: [unfilled] [Ectopics ___] : ectopic pregnancies: [unfilled]  [FreeTextEntry1] : IUD

## 2022-11-23 ENCOUNTER — NON-APPOINTMENT (OUTPATIENT)
Age: 42
End: 2022-11-23

## 2022-11-23 LAB
25(OH)D3 SERPL-MCNC: 24.9 NG/ML
ALBUMIN SERPL ELPH-MCNC: 4.7 G/DL
ALP BLD-CCNC: 99 U/L
ALT SERPL-CCNC: 9 U/L
ANION GAP SERPL CALC-SCNC: 13 MMOL/L
AST SERPL-CCNC: 14 U/L
BASOPHILS # BLD AUTO: 0.07 K/UL
BASOPHILS NFR BLD AUTO: 0.6 %
BILIRUB SERPL-MCNC: 0.8 MG/DL
BUN SERPL-MCNC: 9 MG/DL
CALCIUM SERPL-MCNC: 9.6 MG/DL
CHLORIDE SERPL-SCNC: 103 MMOL/L
CHOLEST SERPL-MCNC: 200 MG/DL
CO2 SERPL-SCNC: 20 MMOL/L
CREAT SERPL-MCNC: 0.72 MG/DL
EGFR: 107 ML/MIN/1.73M2
EOSINOPHIL # BLD AUTO: 0.15 K/UL
EOSINOPHIL NFR BLD AUTO: 1.2 %
ESTIMATED AVERAGE GLUCOSE: 114 MG/DL
GLUCOSE SERPL-MCNC: 85 MG/DL
HBA1C MFR BLD HPLC: 5.6 %
HCT VFR BLD CALC: 45.6 %
HDLC SERPL-MCNC: 54 MG/DL
HGB BLD-MCNC: 14.5 G/DL
IMM GRANULOCYTES NFR BLD AUTO: 0.7 %
LDLC SERPL CALC-MCNC: 118 MG/DL
LYMPHOCYTES # BLD AUTO: 3.05 K/UL
LYMPHOCYTES NFR BLD AUTO: 24 %
MAN DIFF?: NORMAL
MCHC RBC-ENTMCNC: 28.5 PG
MCHC RBC-ENTMCNC: 31.8 GM/DL
MCV RBC AUTO: 89.6 FL
MONOCYTES # BLD AUTO: 0.92 K/UL
MONOCYTES NFR BLD AUTO: 7.2 %
NEUTROPHILS # BLD AUTO: 8.44 K/UL
NEUTROPHILS NFR BLD AUTO: 66.3 %
NONHDLC SERPL-MCNC: 146 MG/DL
PLATELET # BLD AUTO: 347 K/UL
POTASSIUM SERPL-SCNC: 4.4 MMOL/L
PROT SERPL-MCNC: 7 G/DL
RBC # BLD: 5.09 M/UL
RBC # FLD: 13.2 %
SODIUM SERPL-SCNC: 136 MMOL/L
TRIGL SERPL-MCNC: 139 MG/DL
TSH SERPL-ACNC: 1.01 UIU/ML
WBC # FLD AUTO: 12.72 K/UL

## 2022-11-29 ENCOUNTER — NON-APPOINTMENT (OUTPATIENT)
Age: 42
End: 2022-11-29

## 2022-12-06 ENCOUNTER — APPOINTMENT (OUTPATIENT)
Dept: INTERNAL MEDICINE | Facility: CLINIC | Age: 42
End: 2022-12-06

## 2022-12-06 VITALS
HEART RATE: 95 BPM | HEIGHT: 68 IN | BODY MASS INDEX: 30.77 KG/M2 | RESPIRATION RATE: 16 BRPM | SYSTOLIC BLOOD PRESSURE: 122 MMHG | TEMPERATURE: 97.5 F | DIASTOLIC BLOOD PRESSURE: 80 MMHG | WEIGHT: 203 LBS | OXYGEN SATURATION: 98 %

## 2022-12-06 PROCEDURE — 36415 COLL VENOUS BLD VENIPUNCTURE: CPT

## 2022-12-06 PROCEDURE — 99213 OFFICE O/P EST LOW 20 MIN: CPT | Mod: 25

## 2022-12-06 RX ORDER — DOXYCYCLINE HYCLATE 100 MG/1
100 CAPSULE ORAL
Qty: 14 | Refills: 0 | Status: COMPLETED | COMMUNITY
Start: 2022-11-30

## 2022-12-06 NOTE — REVIEW OF SYSTEMS
[Fatigue] : fatigue [Recent Change In Weight] : ~T recent weight change [Vision Problems] : vision problems [Fever] : no fever [Chills] : no chills [Night Sweats] : no night sweats [Pain] : no pain [Redness] : no redness [Itching] : no itching [Earache] : no earache [Nosebleed] : no nosebleeds [Nasal Discharge] : no nasal discharge [Sore Throat] : no sore throat [Postnasal Drip] : no postnasal drip [Chest Pain] : no chest pain [Palpitations] : no palpitations [Leg Claudication] : no leg claudication [Lower Ext Edema] : no lower extremity edema [Shortness Of Breath] : no shortness of breath [Wheezing] : no wheezing [Cough] : no cough [Dyspnea on Exertion] : no dyspnea on exertion [Abdominal Pain] : no abdominal pain [Nausea] : no nausea [Constipation] : no constipation [Diarrhea] : diarrhea [Vomiting] : no vomiting [Heartburn] : no heartburn [Melena] : no melena [Dysuria] : no dysuria [Incontinence] : incontinence [Hematuria] : no hematuria [Frequency] : no frequency [Joint Pain] : no joint pain [Muscle Pain] : no muscle pain [Back Pain] : no back pain [Skin Rash] : no skin rash [Headache] : no headache [Dizziness] : no dizziness [Memory Loss] : no memory loss [Insomnia] : no insomnia [Easy Bleeding] : no easy bleeding [Easy Bruising] : no easy bruising [Swollen Glands] : no swollen glands [Negative] : Genitourinary [FreeTextEntry2] : weight gain  [FreeTextEntry9] : shoulder

## 2022-12-06 NOTE — COUNSELING
[Behavioral health counseling provided] : Behavioral health counseling provided [Engage in a relaxing activity] : Engage in a relaxing activity [AUDIT-C Screening administered and reviewed] : AUDIT-C Screening administered and reviewed [Benefits of weight loss discussed] : Benefits of weight loss discussed [Encouraged to increase physical activity] : Encouraged to increase physical activity [Good understanding] : Patient has a good understanding of disease, goals and obesity follow-up plan [Weight management counseling provided] : Weight management [Healthy eating counseling provided] : healthy eating [Needs reinforcement, provided] : Patient needs reinforcement on understanding of disease, goals and obesity follow-up plan; reinforcement was provided [Low Fat Diet] : Low fat diet [Decrease Portions] : Decrease food portions [FreeTextEntry2] : GLUTEN FREE DIET  [de-identified] : Diet and exercise advised

## 2022-12-06 NOTE — HEALTH RISK ASSESSMENT
[Never] : Never [Yes] : Yes [Monthly or less (1 pt)] : Monthly or less (1 point) [1 or 2 (0 pts)] : 1 or 2 (0 points) [Never (0 pts)] : Never (0 points) [No] : In the past 12 months have you used drugs other than those required for medical reasons? No [No falls in past year] : Patient reported no falls in the past year [0] : 1) Little interest or pleasure doing things: Not at all (0) [1] : 2) Feeling down, depressed, or hopeless for several days (1) [PHQ-2 Negative - No further assessment needed] : PHQ-2 Negative - No further assessment needed [de-identified] : Orthopedic, Dr Pritchett,  GYN  [de-identified] : Social [Audit-CScore] : 2 [de-identified] : no exercise  [de-identified] : Gluten free  [[id number]] : 1

## 2022-12-06 NOTE — HISTORY OF PRESENT ILLNESS
[FreeTextEntry1] : follow up s/p testing positive for covid and abnormal labs [de-identified] : Mrs. ALAS is a 41 year  female, with a past medical history as noted below,who present to the office  today for repeat CBC.  Patient states during her physical she had blood work taking and her white blood cell count is elevated.  The next day she became sick and over the weekend tested positive for COVID-19.  Feeling much better now but still has left nasal congestion/pressure.  Using over-the-counter nasal spray without any relief.\par \par

## 2022-12-06 NOTE — ASSESSMENT
[FreeTextEntry1] : Mrs. ALAS is a 41 year  female, who present to the office for follow-up status post COVID-19 infection as well as elevated white blood cell count.  Still with persistent left sinus pressure

## 2022-12-06 NOTE — PHYSICAL EXAM
[No Acute Distress] : no acute distress [Well Nourished] : well nourished [Well Developed] : well developed [Well-Appearing] : well-appearing [Normal Sclera/Conjunctiva] : normal sclera/conjunctiva [PERRL] : pupils equal round and reactive to light [EOMI] : extraocular movements intact [Normal Outer Ear/Nose] : the outer ears and nose were normal in appearance [Normal Oropharynx] : the oropharynx was normal [Normal TMs] : both tympanic membranes were normal [Normal Nasal Mucosa] : the nasal mucosa was normal [No JVD] : no jugular venous distention [Supple] : supple [No Lymphadenopathy] : no lymphadenopathy [No Respiratory Distress] : no respiratory distress  [Clear to Auscultation] : lungs were clear to auscultation bilaterally [Normal Rate] : normal rate  [Regular Rhythm] : with a regular rhythm [Normal S1, S2] : normal S1 and S2 [No Carotid Bruits] : no carotid bruits [No Abdominal Bruit] : a ~M bruit was not heard ~T in the abdomen [Pedal Pulses Present] : the pedal pulses are present [No Edema] : there was no peripheral edema [No Extremity Clubbing/Cyanosis] : no extremity clubbing/cyanosis [No Palpable Aorta] : no palpable aorta [Soft] : abdomen soft [Non Tender] : non-tender [Non-distended] : non-distended [No Masses] : no abdominal mass palpated [No HSM] : no HSM [Normal Bowel Sounds] : normal bowel sounds [Obese] : obese [Striae] : striae were noted [Normal Posterior Cervical Nodes] : no posterior cervical lymphadenopathy [Normal Anterior Cervical Nodes] : no anterior cervical lymphadenopathy [No CVA Tenderness] : no CVA  tenderness [No Spinal Tenderness] : no spinal tenderness [No Joint Swelling] : no joint swelling [Grossly Normal Strength/Tone] : grossly normal strength/tone [No Rash] : no rash [Thin Hair] : thin hair [Normal Gait] : normal gait [Coordination Grossly Intact] : coordination grossly intact [No Focal Deficits] : no focal deficits [Deep Tendon Reflexes (DTR)] : deep tendon reflexes were 2+ and symmetric [Speech Grossly Normal] : speech grossly normal [Normal Affect] : the affect was normal [Alert and Oriented x3] : oriented to person, place, and time [Normal Mood] : the mood was normal [Normal Insight/Judgement] : insight and judgment were intact [de-identified] : with murmur  [de-identified] :  as per gynecology [FreeTextEntry1] :  as per gynecology [de-identified] :  as per gynecology [de-identified] : multiple nevi on the back

## 2022-12-09 ENCOUNTER — NON-APPOINTMENT (OUTPATIENT)
Age: 42
End: 2022-12-09

## 2022-12-09 LAB
BASOPHILS # BLD AUTO: 0.1 K/UL
BASOPHILS NFR BLD AUTO: 0.8 %
EOSINOPHIL # BLD AUTO: 0.24 K/UL
EOSINOPHIL NFR BLD AUTO: 1.8 %
HCT VFR BLD CALC: 46.1 %
HGB BLD-MCNC: 14.6 G/DL
IMM GRANULOCYTES NFR BLD AUTO: 1.1 %
LYMPHOCYTES # BLD AUTO: 3.4 K/UL
LYMPHOCYTES NFR BLD AUTO: 26.2 %
MAN DIFF?: NORMAL
MCHC RBC-ENTMCNC: 28.1 PG
MCHC RBC-ENTMCNC: 31.7 GM/DL
MCV RBC AUTO: 88.7 FL
MONOCYTES # BLD AUTO: 1.11 K/UL
MONOCYTES NFR BLD AUTO: 8.5 %
NEUTROPHILS # BLD AUTO: 8.01 K/UL
NEUTROPHILS NFR BLD AUTO: 61.6 %
PLATELET # BLD AUTO: 381 K/UL
RBC # BLD: 5.2 M/UL
RBC # FLD: 12.7 %
WBC # FLD AUTO: 13 K/UL

## 2022-12-10 ENCOUNTER — OUTPATIENT (OUTPATIENT)
Dept: OUTPATIENT SERVICES | Facility: HOSPITAL | Age: 42
LOS: 1 days | End: 2022-12-10
Payer: COMMERCIAL

## 2022-12-10 ENCOUNTER — APPOINTMENT (OUTPATIENT)
Dept: RADIOLOGY | Facility: CLINIC | Age: 42
End: 2022-12-10

## 2022-12-10 DIAGNOSIS — D72.829 ELEVATED WHITE BLOOD CELL COUNT, UNSPECIFIED: ICD-10-CM

## 2022-12-10 DIAGNOSIS — Z00.8 ENCOUNTER FOR OTHER GENERAL EXAMINATION: ICD-10-CM

## 2022-12-10 PROCEDURE — 71046 X-RAY EXAM CHEST 2 VIEWS: CPT

## 2022-12-10 PROCEDURE — 71046 X-RAY EXAM CHEST 2 VIEWS: CPT | Mod: 26

## 2022-12-12 ENCOUNTER — NON-APPOINTMENT (OUTPATIENT)
Age: 42
End: 2022-12-12

## 2022-12-19 ENCOUNTER — APPOINTMENT (OUTPATIENT)
Dept: INTERNAL MEDICINE | Facility: CLINIC | Age: 42
End: 2022-12-19

## 2022-12-19 VITALS
BODY MASS INDEX: 30.77 KG/M2 | SYSTOLIC BLOOD PRESSURE: 118 MMHG | HEART RATE: 95 BPM | DIASTOLIC BLOOD PRESSURE: 86 MMHG | TEMPERATURE: 98.2 F | OXYGEN SATURATION: 98 % | RESPIRATION RATE: 16 BRPM | WEIGHT: 203 LBS | HEIGHT: 68 IN

## 2022-12-19 DIAGNOSIS — R59.0 LOCALIZED ENLARGED LYMPH NODES: ICD-10-CM

## 2022-12-19 PROCEDURE — 36415 COLL VENOUS BLD VENIPUNCTURE: CPT

## 2022-12-19 PROCEDURE — 99213 OFFICE O/P EST LOW 20 MIN: CPT | Mod: 25

## 2022-12-19 NOTE — ASSESSMENT
[FreeTextEntry1] : Mrs. ALAS is a 41 year  female, who present to the office for follow-up status post COVID-19 infection as well as elevated white blood cell count.  sinus pressure/congestion resolving and has improved

## 2022-12-19 NOTE — REVIEW OF SYSTEMS
[Fatigue] : fatigue [Recent Change In Weight] : ~T recent weight change [Vision Problems] : vision problems [Incontinence] : incontinence [Negative] : Psychiatric [Fever] : no fever [Chills] : no chills [Night Sweats] : no night sweats [Pain] : no pain [Redness] : no redness [Itching] : no itching [Earache] : no earache [Nosebleed] : no nosebleeds [Nasal Discharge] : no nasal discharge [Sore Throat] : no sore throat [Postnasal Drip] : no postnasal drip [Chest Pain] : no chest pain [Palpitations] : no palpitations [Leg Claudication] : no leg claudication [Lower Ext Edema] : no lower extremity edema [Shortness Of Breath] : no shortness of breath [Wheezing] : no wheezing [Cough] : no cough [Dyspnea on Exertion] : no dyspnea on exertion [Abdominal Pain] : no abdominal pain [Nausea] : no nausea [Constipation] : no constipation [Diarrhea] : diarrhea [Vomiting] : no vomiting [Heartburn] : no heartburn [Melena] : no melena [Dysuria] : no dysuria [Hematuria] : no hematuria [Frequency] : no frequency [Joint Pain] : no joint pain [Muscle Pain] : no muscle pain [Back Pain] : no back pain [Itching] : no itching [Skin Rash] : no skin rash [Headache] : no headache [Dizziness] : no dizziness [Memory Loss] : no memory loss [Insomnia] : no insomnia [Easy Bleeding] : no easy bleeding [Easy Bruising] : no easy bruising [Swollen Glands] : no swollen glands [FreeTextEntry2] : weight gain  [FreeTextEntry9] : shoulder

## 2022-12-19 NOTE — HISTORY OF PRESENT ILLNESS
[FreeTextEntry1] : Follow-up status post COVID infection elevated white blood cell count [de-identified] : Mrs. ALAS is a 41 year  female, with a past medical history as noted below,who present to the office  today for  follow-up on abnormal labs.  Patient states she is feeling much better.  Still with slight nasal congestion on the left side but overall feeling much better.  Denies any recent fever chills night sweats nausea or vomiting.\par   Unaware of any swollen glands.  Breathing has been normal.

## 2022-12-19 NOTE — HEALTH RISK ASSESSMENT
[Never] : Never [Yes] : Yes [Monthly or less (1 pt)] : Monthly or less (1 point) [1 or 2 (0 pts)] : 1 or 2 (0 points) [Never (0 pts)] : Never (0 points) [No] : In the past 12 months have you used drugs other than those required for medical reasons? No [No falls in past year] : Patient reported no falls in the past year [0] : 1) Little interest or pleasure doing things: Not at all (0) [1] : 2) Feeling down, depressed, or hopeless for several days (1) [PHQ-2 Negative - No further assessment needed] : PHQ-2 Negative - No further assessment needed [de-identified] : Orthopedic, Dr Pritchett,  GYN  [de-identified] : Social [Audit-CScore] : 2 [de-identified] : no exercise  [de-identified] : Gluten free  [[id number]] : 1

## 2022-12-19 NOTE — COUNSELING
[Behavioral health counseling provided] : Behavioral health counseling provided [Engage in a relaxing activity] : Engage in a relaxing activity [AUDIT-C Screening administered and reviewed] : AUDIT-C Screening administered and reviewed [Benefits of weight loss discussed] : Benefits of weight loss discussed [Encouraged to increase physical activity] : Encouraged to increase physical activity [Good understanding] : Patient has a good understanding of disease, goals and obesity follow-up plan [Weight management counseling provided] : Weight management [Healthy eating counseling provided] : healthy eating [Needs reinforcement, provided] : Patient needs reinforcement on understanding of disease, goals and obesity follow-up plan; reinforcement was provided [Low Fat Diet] : Low fat diet [Decrease Portions] : Decrease food portions [FreeTextEntry2] : GLUTEN FREE DIET  [de-identified] : Diet and exercise advised

## 2022-12-19 NOTE — PHYSICAL EXAM
[No Acute Distress] : no acute distress [Well Nourished] : well nourished [Well Developed] : well developed [Well-Appearing] : well-appearing [Normal Sclera/Conjunctiva] : normal sclera/conjunctiva [PERRL] : pupils equal round and reactive to light [EOMI] : extraocular movements intact [Normal Outer Ear/Nose] : the outer ears and nose were normal in appearance [Normal Oropharynx] : the oropharynx was normal [Normal TMs] : both tympanic membranes were normal [Normal Nasal Mucosa] : the nasal mucosa was normal [No JVD] : no jugular venous distention [Supple] : supple [No Respiratory Distress] : no respiratory distress  [Clear to Auscultation] : lungs were clear to auscultation bilaterally [Normal Rate] : normal rate  [Regular Rhythm] : with a regular rhythm [Normal S1, S2] : normal S1 and S2 [No Carotid Bruits] : no carotid bruits [No Abdominal Bruit] : a ~M bruit was not heard ~T in the abdomen [Pedal Pulses Present] : the pedal pulses are present [No Edema] : there was no peripheral edema [No Extremity Clubbing/Cyanosis] : no extremity clubbing/cyanosis [No Palpable Aorta] : no palpable aorta [Soft] : abdomen soft [Non Tender] : non-tender [Non-distended] : non-distended [No Masses] : no abdominal mass palpated [No HSM] : no HSM [Normal Bowel Sounds] : normal bowel sounds [Obese] : obese [Striae] : striae were noted [Normal Posterior Cervical Nodes] : no posterior cervical lymphadenopathy [Normal Anterior Cervical Nodes] : no anterior cervical lymphadenopathy [No CVA Tenderness] : no CVA  tenderness [No Spinal Tenderness] : no spinal tenderness [No Joint Swelling] : no joint swelling [Grossly Normal Strength/Tone] : grossly normal strength/tone [No Rash] : no rash [Thin Hair] : thin hair [Normal Gait] : normal gait [Coordination Grossly Intact] : coordination grossly intact [No Focal Deficits] : no focal deficits [Deep Tendon Reflexes (DTR)] : deep tendon reflexes were 2+ and symmetric [Speech Grossly Normal] : speech grossly normal [Normal Affect] : the affect was normal [Alert and Oriented x3] : oriented to person, place, and time [Normal Mood] : the mood was normal [Normal Insight/Judgement] : insight and judgment were intact [de-identified] :  questionable left anterior node/tonsillary node appreciated [de-identified] : with murmur  [de-identified] :  as per gynecology [FreeTextEntry1] :  as per gynecology [de-identified] :  as per gynecology [de-identified] : multiple nevi on the back

## 2022-12-19 NOTE — PLAN
[FreeTextEntry1] : I&D -COVID-19 infection resolving.\par Hematology  -elevated white blood cell count more likely secondary to underlying COVID-19 infection.  Repeat CBC today.  We will discuss further management pending labs.\par   Advised patient we will call her tomorrow regarding a CBC if remains elevated follow-up with hematology.\par \par ENT -left nasal/sinus pressure  -continue with nasal spray.  \par  questionable left tonsillary\par /Anterior cervical chain node check neck sonogram.\par \par Gastroenterology.\par GERD. Continue with Dexilant  daily. Followup gastroenterology.  Continue gluten-free diet \par Advise diet. Avoid large meals and lying down after eating. \par History of Constipation   Increase fluids and fiber in diet \par  \par Endocrinology.\par Thyroid nodule. Followup with endocrinologist. States she had a recent sonogram from Dr. santiago office  showed no acute changes in her thyroid nodules and does not really require any other follow-up. \par \par Adult BMI screening and followup:  The patient's BMI is about 30.8. Counseled patient regarding BMI, healthy eating, portion control and exercise importance of diet to maintain a healthy weight. \par Counseled patient on importance of exercise to maintain a healthy weight.  Discussed the use of  Ozempic for weight loss.  Once patient feeling better we will consider using Wegovy \par Advised 1500 damaris diet \par Allergies. Continue with over-the-counter antihistamine.\par \par \par Gynecology-advised to follow-up with gynecologist for routine Pap smear/pelvic exam.  Also advised yearly clinical breast exam as well as mammogram.  Advised self breast exam if notice any changes in breast tissue   she should have it evaluated by a medical provider. \par Advised Kegel exercises\par \par We discussed the importance of healthy lifestyles which include exercise, weight control and good diet.\par Patient's questions were answered in full detail. Advise patient if any other concerns arise to please call office to have a discussion. \par \par Patient  education  - COVID-19   Counseling and education provided to the patient.  Advised sign and symptoms of the virus.  Advised contact precautions.  Educated patient on proper hand washing and to participate in social distancing.  Advised to get covid booster  to cover the omicron variant\par \par Patient is in full awareness of the plan and agrees to it.  All pt question was answered.  \par \par I spent 20 Minutes with the patient, half of which we discussed finding on physical exam  and coordinated care.  As well as reviewed my plans and follow ups.

## 2022-12-20 LAB
BASOPHILS # BLD AUTO: 0.05 K/UL
BASOPHILS NFR BLD AUTO: 0.5 %
EOSINOPHIL # BLD AUTO: 0.21 K/UL
EOSINOPHIL NFR BLD AUTO: 2 %
HCT VFR BLD CALC: 44.8 %
HGB BLD-MCNC: 14.4 G/DL
IMM GRANULOCYTES NFR BLD AUTO: 0.6 %
LYMPHOCYTES # BLD AUTO: 2.53 K/UL
LYMPHOCYTES NFR BLD AUTO: 24.4 %
MAN DIFF?: NORMAL
MCHC RBC-ENTMCNC: 29 PG
MCHC RBC-ENTMCNC: 32.1 GM/DL
MCV RBC AUTO: 90.3 FL
MONOCYTES # BLD AUTO: 0.74 K/UL
MONOCYTES NFR BLD AUTO: 7.1 %
NEUTROPHILS # BLD AUTO: 6.76 K/UL
NEUTROPHILS NFR BLD AUTO: 65.4 %
PLATELET # BLD AUTO: 360 K/UL
RBC # BLD: 4.96 M/UL
RBC # FLD: 13.2 %
WBC # FLD AUTO: 10.35 K/UL

## 2022-12-22 ENCOUNTER — APPOINTMENT (OUTPATIENT)
Dept: ULTRASOUND IMAGING | Facility: CLINIC | Age: 42
End: 2022-12-22
Payer: COMMERCIAL

## 2022-12-22 PROCEDURE — 76536 US EXAM OF HEAD AND NECK: CPT

## 2023-01-04 ENCOUNTER — NON-APPOINTMENT (OUTPATIENT)
Age: 43
End: 2023-01-04

## 2023-02-28 ENCOUNTER — APPOINTMENT (OUTPATIENT)
Dept: INTERNAL MEDICINE | Facility: CLINIC | Age: 43
End: 2023-02-28
Payer: COMMERCIAL

## 2023-02-28 VITALS
BODY MASS INDEX: 30.62 KG/M2 | RESPIRATION RATE: 17 BRPM | OXYGEN SATURATION: 99 % | DIASTOLIC BLOOD PRESSURE: 74 MMHG | SYSTOLIC BLOOD PRESSURE: 118 MMHG | WEIGHT: 202 LBS | HEIGHT: 68 IN | HEART RATE: 100 BPM

## 2023-02-28 PROCEDURE — 99213 OFFICE O/P EST LOW 20 MIN: CPT | Mod: 25

## 2023-02-28 PROCEDURE — 36415 COLL VENOUS BLD VENIPUNCTURE: CPT

## 2023-03-01 NOTE — COUNSELING
[Behavioral health counseling provided] : Behavioral health counseling provided [Engage in a relaxing activity] : Engage in a relaxing activity [AUDIT-C Screening administered and reviewed] : AUDIT-C Screening administered and reviewed [Benefits of weight loss discussed] : Benefits of weight loss discussed [Encouraged to increase physical activity] : Encouraged to increase physical activity [Good understanding] : Patient has a good understanding of disease, goals and obesity follow-up plan [Weight management counseling provided] : Weight management [Healthy eating counseling provided] : healthy eating [Needs reinforcement, provided] : Patient needs reinforcement on understanding of disease, goals and obesity follow-up plan; reinforcement was provided [Low Fat Diet] : Low fat diet [Decrease Portions] : Decrease food portions [FreeTextEntry2] : GLUTEN FREE DIET  [de-identified] : Diet and exercise advised

## 2023-03-01 NOTE — PLAN
[FreeTextEntry1] : Heme hx leukocytosis s/p covid - LAtest WBC was normal. Repeat CBC today \par \par \par Gastroenterology.\par GERD. Continue with Dexilant  daily. Followup gastroenterology.  Continue gluten-free diet \par Advise diet. Avoid large meals and lying down after eating. \par History of Constipation   Increase fluids and fiber in diet \par  \par Endocrinology.\par Thyroid nodule. Followup with endocrinologist. States she had a recent sonogram from Dr. santiago office  showed no acute changes in her thyroid nodules and does not really require any other follow-up. \par No hx meddularry thyroid cancer  or endocrine tumors \par \par Adult BMI screening and followup:  The patient's BMI is about 30.8. Counseled patient regarding BMI, healthy eating, portion control and exercise importance of diet to maintain a healthy weight. \par Counseled patient on importance of exercise to maintain a healthy weight. Continue with wegovy consider increasing dose after the 8th week \par Advised 1500 damaris diet \par Allergies. Continue with over-the-counter antihistamine.\par encourage to start an exercise program. \par \par Gynecology-advised to follow-up with gynecologist for routine Pap smear/pelvic exam.  Also advised yearly clinical breast exam as well as mammogram.  Advised self breast exam if notice any changes in breast tissue   she should have it evaluated by a medical provider. \par Advised Kegel exercises\par \par We discussed the importance of healthy lifestyles which include exercise, weight control and good diet.\par Patient's questions were answered in full detail. Advise patient if any other concerns arise to please call office to have a discussion. \par \par Patient  education  - COVID-19   Counseling and education provided to the patient.  Advised sign and symptoms of the virus.  Advised contact precautions.  Educated patient on proper hand washing and to participate in social distancing.  Advised to get covid booster  to cover the omicron variant\par \par Patient is in full awareness of the plan and agrees to it.  All pt question was answered.  \par \par I spent 20 Minutes with the patient, half of which we discussed finding on physical exam  and coordinated care.  As well as reviewed my plans and follow ups.

## 2023-03-01 NOTE — ASSESSMENT
[FreeTextEntry1] : Mrs. ALAS is a 42 year  female, with a past medical history as noted below,who present to the office  today for medication follow up and a weight check

## 2023-03-01 NOTE — REVIEW OF SYSTEMS
[Recent Change In Weight] : ~T recent weight change [Incontinence] : incontinence [Negative] : Psychiatric [Fever] : no fever [Chills] : no chills [Fatigue] : no fatigue [Night Sweats] : no night sweats [Pain] : no pain [Redness] : no redness [Vision Problems] : no vision problems [Itching] : no itching [Earache] : no earache [Nosebleed] : no nosebleeds [Nasal Discharge] : no nasal discharge [Sore Throat] : no sore throat [Postnasal Drip] : no postnasal drip [Chest Pain] : no chest pain [Palpitations] : no palpitations [Leg Claudication] : no leg claudication [Lower Ext Edema] : no lower extremity edema [Shortness Of Breath] : no shortness of breath [Wheezing] : no wheezing [Cough] : no cough [Dyspnea on Exertion] : no dyspnea on exertion [Abdominal Pain] : no abdominal pain [Nausea] : no nausea [Constipation] : no constipation [Diarrhea] : diarrhea [Vomiting] : no vomiting [Heartburn] : no heartburn [Melena] : no melena [Dysuria] : no dysuria [Hematuria] : no hematuria [Frequency] : no frequency [Joint Pain] : no joint pain [Muscle Pain] : no muscle pain [Back Pain] : no back pain [Itching] : no itching [Skin Rash] : no skin rash [Headache] : no headache [Dizziness] : no dizziness [Memory Loss] : no memory loss [Insomnia] : no insomnia [Easy Bleeding] : no easy bleeding [Easy Bruising] : no easy bruising [Swollen Glands] : no swollen glands [FreeTextEntry2] : weight loss 4 lbs

## 2023-03-01 NOTE — PHYSICAL EXAM
[No Acute Distress] : no acute distress [Well Nourished] : well nourished [Well Developed] : well developed [Well-Appearing] : well-appearing [Normal Sclera/Conjunctiva] : normal sclera/conjunctiva [PERRL] : pupils equal round and reactive to light [EOMI] : extraocular movements intact [Normal Outer Ear/Nose] : the outer ears and nose were normal in appearance [Normal Oropharynx] : the oropharynx was normal [Normal TMs] : both tympanic membranes were normal [Normal Nasal Mucosa] : the nasal mucosa was normal [No JVD] : no jugular venous distention [Supple] : supple [No Respiratory Distress] : no respiratory distress  [Clear to Auscultation] : lungs were clear to auscultation bilaterally [Normal Rate] : normal rate  [Regular Rhythm] : with a regular rhythm [Normal S1, S2] : normal S1 and S2 [No Carotid Bruits] : no carotid bruits [No Abdominal Bruit] : a ~M bruit was not heard ~T in the abdomen [Pedal Pulses Present] : the pedal pulses are present [No Edema] : there was no peripheral edema [No Extremity Clubbing/Cyanosis] : no extremity clubbing/cyanosis [No Palpable Aorta] : no palpable aorta [Soft] : abdomen soft [Non Tender] : non-tender [Non-distended] : non-distended [No Masses] : no abdominal mass palpated [No HSM] : no HSM [Normal Bowel Sounds] : normal bowel sounds [Obese] : obese [Striae] : striae were noted [Normal Posterior Cervical Nodes] : no posterior cervical lymphadenopathy [Normal Anterior Cervical Nodes] : no anterior cervical lymphadenopathy [No CVA Tenderness] : no CVA  tenderness [No Spinal Tenderness] : no spinal tenderness [No Joint Swelling] : no joint swelling [Grossly Normal Strength/Tone] : grossly normal strength/tone [No Rash] : no rash [Thin Hair] : thin hair [Normal Gait] : normal gait [Coordination Grossly Intact] : coordination grossly intact [No Focal Deficits] : no focal deficits [Deep Tendon Reflexes (DTR)] : deep tendon reflexes were 2+ and symmetric [Speech Grossly Normal] : speech grossly normal [Normal Affect] : the affect was normal [Alert and Oriented x3] : oriented to person, place, and time [Normal Mood] : the mood was normal [Normal Insight/Judgement] : insight and judgment were intact [de-identified] : with murmur  [de-identified] :  as per gynecology [FreeTextEntry1] :  as per gynecology [de-identified] :  as per gynecology [de-identified] : multiple nevi on the back

## 2023-03-01 NOTE — HISTORY OF PRESENT ILLNESS
[FreeTextEntry1] : Medication renewal and follow up [de-identified] : Mrs. ALAS is a 42 year  female, with a past medical history as noted below,who present to the office  today for a weight check and discussion on current medication regimen. \par Denies side effects to current meds\par States she lost 4 lbs since starting Wegovy (7 weeks )- Denies starting exercise program

## 2023-03-01 NOTE — HEALTH RISK ASSESSMENT
[Yes] : Yes [Monthly or less (1 pt)] : Monthly or less (1 point) [1 or 2 (0 pts)] : 1 or 2 (0 points) [Never (0 pts)] : Never (0 points) [No] : In the past 12 months have you used drugs other than those required for medical reasons? No [No falls in past year] : Patient reported no falls in the past year [0] : 1) Little interest or pleasure doing things: Not at all (0) [1] : 2) Feeling down, depressed, or hopeless for several days (1) [PHQ-2 Negative - No further assessment needed] : PHQ-2 Negative - No further assessment needed [Never] : Never [de-identified] : Orthopedic, Dr Pritchett,  GYN  [de-identified] : Social [Audit-CScore] : 2 [de-identified] : no exercise  [de-identified] : Gluten free  [[id number]] : 1

## 2023-03-06 LAB
ALBUMIN SERPL ELPH-MCNC: 4.8 G/DL
ALP BLD-CCNC: 102 U/L
ALT SERPL-CCNC: 14 U/L
ANION GAP SERPL CALC-SCNC: 14 MMOL/L
AST SERPL-CCNC: 16 U/L
BASOPHILS # BLD AUTO: 0.06 K/UL
BASOPHILS NFR BLD AUTO: 0.5 %
BILIRUB SERPL-MCNC: 0.7 MG/DL
BUN SERPL-MCNC: 11 MG/DL
CALCIUM SERPL-MCNC: 10.3 MG/DL
CHLORIDE SERPL-SCNC: 101 MMOL/L
CO2 SERPL-SCNC: 25 MMOL/L
CREAT SERPL-MCNC: 0.76 MG/DL
EGFR: 100 ML/MIN/1.73M2
EOSINOPHIL # BLD AUTO: 0.12 K/UL
EOSINOPHIL NFR BLD AUTO: 1.1 %
GLUCOSE SERPL-MCNC: 89 MG/DL
HCT VFR BLD CALC: 48.2 %
HGB BLD-MCNC: 15.2 G/DL
IMM GRANULOCYTES NFR BLD AUTO: 0.4 %
LYMPHOCYTES # BLD AUTO: 2.84 K/UL
LYMPHOCYTES NFR BLD AUTO: 25.2 %
MAN DIFF?: NORMAL
MCHC RBC-ENTMCNC: 28.6 PG
MCHC RBC-ENTMCNC: 31.5 GM/DL
MCV RBC AUTO: 90.6 FL
MONOCYTES # BLD AUTO: 1 K/UL
MONOCYTES NFR BLD AUTO: 8.9 %
NEUTROPHILS # BLD AUTO: 7.19 K/UL
NEUTROPHILS NFR BLD AUTO: 63.9 %
PLATELET # BLD AUTO: 357 K/UL
POTASSIUM SERPL-SCNC: 4.9 MMOL/L
PROT SERPL-MCNC: 7.3 G/DL
RBC # BLD: 5.32 M/UL
RBC # FLD: 13 %
SODIUM SERPL-SCNC: 140 MMOL/L
WBC # FLD AUTO: 11.25 K/UL

## 2023-05-02 ENCOUNTER — APPOINTMENT (OUTPATIENT)
Dept: INTERNAL MEDICINE | Facility: CLINIC | Age: 43
End: 2023-05-02
Payer: COMMERCIAL

## 2023-05-02 VITALS
SYSTOLIC BLOOD PRESSURE: 116 MMHG | WEIGHT: 188 LBS | RESPIRATION RATE: 17 BRPM | BODY MASS INDEX: 28.49 KG/M2 | HEART RATE: 106 BPM | HEIGHT: 68 IN | TEMPERATURE: 98.3 F | DIASTOLIC BLOOD PRESSURE: 78 MMHG | OXYGEN SATURATION: 98 %

## 2023-05-02 DIAGNOSIS — D72.829 ELEVATED WHITE BLOOD CELL COUNT, UNSPECIFIED: ICD-10-CM

## 2023-05-02 PROCEDURE — 36415 COLL VENOUS BLD VENIPUNCTURE: CPT

## 2023-05-02 PROCEDURE — 99213 OFFICE O/P EST LOW 20 MIN: CPT | Mod: 25

## 2023-05-02 NOTE — ASSESSMENT
[FreeTextEntry1] : Mrs. ALAS is a 42 year  female, with a past medical history as noted below,who present to the office  today for medication Renewal and follow-up on weight loss

## 2023-05-02 NOTE — COUNSELING
[Behavioral health counseling provided] : Behavioral health counseling provided [Engage in a relaxing activity] : Engage in a relaxing activity [AUDIT-C Screening administered and reviewed] : AUDIT-C Screening administered and reviewed [Benefits of weight loss discussed] : Benefits of weight loss discussed [Encouraged to increase physical activity] : Encouraged to increase physical activity [Good understanding] : Patient has a good understanding of disease, goals and obesity follow-up plan [Weight management counseling provided] : Weight management [Healthy eating counseling provided] : healthy eating [Needs reinforcement, provided] : Patient needs reinforcement on understanding of disease, goals and obesity follow-up plan; reinforcement was provided [Low Fat Diet] : Low fat diet [Decrease Portions] : Decrease food portions [FreeTextEntry2] : GLUTEN FREE DIET  [de-identified] : Diet and exercise advised

## 2023-05-02 NOTE — PHYSICAL EXAM
[No Acute Distress] : no acute distress [Well Nourished] : well nourished [Well Developed] : well developed [Well-Appearing] : well-appearing [Normal Sclera/Conjunctiva] : normal sclera/conjunctiva [PERRL] : pupils equal round and reactive to light [EOMI] : extraocular movements intact [Normal Outer Ear/Nose] : the outer ears and nose were normal in appearance [Normal Oropharynx] : the oropharynx was normal [Normal TMs] : both tympanic membranes were normal [Normal Nasal Mucosa] : the nasal mucosa was normal [No JVD] : no jugular venous distention [Supple] : supple [No Respiratory Distress] : no respiratory distress  [Clear to Auscultation] : lungs were clear to auscultation bilaterally [Normal Rate] : normal rate  [Regular Rhythm] : with a regular rhythm [Normal S1, S2] : normal S1 and S2 [No Carotid Bruits] : no carotid bruits [No Abdominal Bruit] : a ~M bruit was not heard ~T in the abdomen [Pedal Pulses Present] : the pedal pulses are present [No Edema] : there was no peripheral edema [No Extremity Clubbing/Cyanosis] : no extremity clubbing/cyanosis [No Palpable Aorta] : no palpable aorta [Soft] : abdomen soft [Non Tender] : non-tender [Non-distended] : non-distended [No Masses] : no abdominal mass palpated [No HSM] : no HSM [Normal Bowel Sounds] : normal bowel sounds [Obese] : obese [Striae] : striae were noted [Normal Posterior Cervical Nodes] : no posterior cervical lymphadenopathy [Normal Anterior Cervical Nodes] : no anterior cervical lymphadenopathy [No CVA Tenderness] : no CVA  tenderness [No Spinal Tenderness] : no spinal tenderness [No Joint Swelling] : no joint swelling [Grossly Normal Strength/Tone] : grossly normal strength/tone [No Rash] : no rash [Thin Hair] : thin hair [Normal Gait] : normal gait [Coordination Grossly Intact] : coordination grossly intact [No Focal Deficits] : no focal deficits [Deep Tendon Reflexes (DTR)] : deep tendon reflexes were 2+ and symmetric [Speech Grossly Normal] : speech grossly normal [Normal Affect] : the affect was normal [Alert and Oriented x3] : oriented to person, place, and time [Normal Mood] : the mood was normal [Normal Insight/Judgement] : insight and judgment were intact [de-identified] : with murmur  [de-identified] :  as per gynecology [FreeTextEntry1] :  as per gynecology [de-identified] :  as per gynecology [de-identified] : multiple nevi on the back

## 2023-05-02 NOTE — REVIEW OF SYSTEMS
[Recent Change In Weight] : ~T recent weight change [Negative] : Psychiatric [Fever] : no fever [Chills] : no chills [Fatigue] : no fatigue [Night Sweats] : no night sweats [Pain] : no pain [Redness] : no redness [Vision Problems] : no vision problems [Itching] : no itching [Earache] : no earache [Nosebleed] : no nosebleeds [Nasal Discharge] : no nasal discharge [Sore Throat] : no sore throat [Postnasal Drip] : no postnasal drip [Chest Pain] : no chest pain [Palpitations] : no palpitations [Leg Claudication] : no leg claudication [Lower Ext Edema] : no lower extremity edema [Shortness Of Breath] : no shortness of breath [Wheezing] : no wheezing [Cough] : no cough [Dyspnea on Exertion] : no dyspnea on exertion [Abdominal Pain] : no abdominal pain [Nausea] : no nausea [Constipation] : no constipation [Diarrhea] : diarrhea [Vomiting] : no vomiting [Heartburn] : no heartburn [Melena] : no melena [Dysuria] : no dysuria [Incontinence] : no incontinence [Hematuria] : no hematuria [Frequency] : no frequency [Joint Pain] : no joint pain [Muscle Pain] : no muscle pain [Back Pain] : no back pain [Itching] : no itching [Skin Rash] : no skin rash [Headache] : no headache [Dizziness] : no dizziness [Memory Loss] : no memory loss [Insomnia] : no insomnia [Anxiety] : no anxiety [Easy Bleeding] : no easy bleeding [Easy Bruising] : no easy bruising [Swollen Glands] : no swollen glands [FreeTextEntry2] : weight loss18 lbs

## 2023-05-02 NOTE — HEALTH RISK ASSESSMENT
[Yes] : Yes [Monthly or less (1 pt)] : Monthly or less (1 point) [1 or 2 (0 pts)] : 1 or 2 (0 points) [Never (0 pts)] : Never (0 points) [No] : In the past 12 months have you used drugs other than those required for medical reasons? No [No falls in past year] : Patient reported no falls in the past year [0] : 1) Little interest or pleasure doing things: Not at all (0) [1] : 2) Feeling down, depressed, or hopeless for several days (1) [PHQ-2 Negative - No further assessment needed] : PHQ-2 Negative - No further assessment needed [Never] : Never [de-identified] : Orthopedic, Dr Pritchett,  GYN  [de-identified] : Social [Audit-CScore] : 2 [de-identified] : no exercise  [de-identified] : Gluten free  [[id number]] : 1

## 2023-05-02 NOTE — HISTORY OF PRESENT ILLNESS
[FreeTextEntry1] : Medication renewal and follow up on Wegovy  [de-identified] : Mrs. ALAS is a 42 year  female, with a past medical history as noted below,who present to the office  today for a weight check and discussion on current medication regimen. States she lost 18 lbs since starting the medication.  Has some constipation. Denies abd pain.  Did start an exercise program \par

## 2023-05-02 NOTE — PLAN
[FreeTextEntry1] : Heme hx leukocytosis repeat CBC today.  Follow-up with hematology.\par \par Gastroenterology.\par GERD. Continue with Dexilant  daily. Followup gastroenterology.  Continue gluten-free diet \par Advise diet. Avoid large meals and lying down after eating. \par History of Constipation   Increase fluids and fiber in diet.\par  \par Endocrinology.\par Thyroid nodule. Followup with endocrinologist. States she had a recent sonogram from Dr. santiago office  showed no acute changes in her thyroid nodules and does not really require any other follow-up. \par No hx meddularry thyroid cancer  or endocrine tumors \par \par Adult BMI screening and followup:  The patient's BMI is about 30.8. Counseled patient regarding BMI, healthy eating, portion control and exercise importance of diet to maintain a healthy weight. \par Counseled patient on importance of exercise to maintain a healthy weight. Continue with wegovy. Check labs \par Advised 1500 damaris diet.\par Continue with exercise program. \par \par Allergies. Continue with over-the-counter antihistamine.\par encourage to start an exercise program. \par \par Gynecology-advised to follow-up with gynecologist for routine Pap smear/pelvic exam.  Also advised yearly clinical breast exam as well as mammogram.  Advised self breast exam if notice any changes in breast tissue   she should have it evaluated by a medical provider. \par Advised Kegel exercises\par \par We discussed the importance of healthy lifestyles which include exercise, weight control and good diet.\par Patient's questions were answered in full detail. Advise patient if any other concerns arise to please call office to have a discussion. \par \par Patient  education  - COVID-19   Counseling and education provided to the patient.  Advised sign and symptoms of the virus.  Advised contact precautions.  Educated patient on proper hand washing and to participate in social distancing.  Advised to get covid booster  to cover the omicron variant\par \par Patient is in full awareness of the plan and agrees to it.  All pt question was answered.  \par \par I spent 25  Minutes with the patient, half of which we discussed finding on physical exam  and coordinated care.  As well as reviewed my plans and follow ups.

## 2023-05-10 ENCOUNTER — NON-APPOINTMENT (OUTPATIENT)
Age: 43
End: 2023-05-10

## 2023-05-10 LAB
ALBUMIN SERPL ELPH-MCNC: 4.9 G/DL
ALP BLD-CCNC: 110 U/L
ALT SERPL-CCNC: 8 U/L
ANION GAP SERPL CALC-SCNC: 15 MMOL/L
AST SERPL-CCNC: 14 U/L
BASOPHILS # BLD AUTO: 0.05 K/UL
BASOPHILS NFR BLD AUTO: 0.5 %
BILIRUB SERPL-MCNC: 1 MG/DL
BUN SERPL-MCNC: 9 MG/DL
CALCIUM SERPL-MCNC: 10 MG/DL
CHLORIDE SERPL-SCNC: 103 MMOL/L
CO2 SERPL-SCNC: 23 MMOL/L
CREAT SERPL-MCNC: 0.76 MG/DL
EGFR: 100 ML/MIN/1.73M2
EOSINOPHIL # BLD AUTO: 0.11 K/UL
EOSINOPHIL NFR BLD AUTO: 1 %
GLUCOSE SERPL-MCNC: 88 MG/DL
HCT VFR BLD CALC: 48.5 %
HGB BLD-MCNC: 15.3 G/DL
IMM GRANULOCYTES NFR BLD AUTO: 0.5 %
LYMPHOCYTES # BLD AUTO: 3.23 K/UL
LYMPHOCYTES NFR BLD AUTO: 29.5 %
MAN DIFF?: NORMAL
MCHC RBC-ENTMCNC: 28.7 PG
MCHC RBC-ENTMCNC: 31.5 GM/DL
MCV RBC AUTO: 91 FL
MONOCYTES # BLD AUTO: 0.84 K/UL
MONOCYTES NFR BLD AUTO: 7.7 %
NEUTROPHILS # BLD AUTO: 6.66 K/UL
NEUTROPHILS NFR BLD AUTO: 60.8 %
PLATELET # BLD AUTO: 374 K/UL
POTASSIUM SERPL-SCNC: 4.4 MMOL/L
PROT SERPL-MCNC: 7.3 G/DL
RBC # BLD: 5.33 M/UL
RBC # FLD: 12.8 %
SODIUM SERPL-SCNC: 141 MMOL/L
T4 FREE SERPL-MCNC: 1.4 NG/DL
TSH SERPL-ACNC: 0.79 UIU/ML
WBC # FLD AUTO: 10.94 K/UL

## 2023-06-15 ENCOUNTER — APPOINTMENT (OUTPATIENT)
Dept: INTERNAL MEDICINE | Facility: CLINIC | Age: 43
End: 2023-06-15
Payer: COMMERCIAL

## 2023-06-15 VITALS
RESPIRATION RATE: 17 BRPM | OXYGEN SATURATION: 95 % | DIASTOLIC BLOOD PRESSURE: 80 MMHG | HEART RATE: 81 BPM | SYSTOLIC BLOOD PRESSURE: 118 MMHG | TEMPERATURE: 98 F | HEIGHT: 68 IN | WEIGHT: 177 LBS | BODY MASS INDEX: 26.83 KG/M2

## 2023-06-15 DIAGNOSIS — K14.3 HYPERTROPHY OF TONGUE PAPILLAE: ICD-10-CM

## 2023-06-15 PROCEDURE — 99214 OFFICE O/P EST MOD 30 MIN: CPT

## 2023-06-16 NOTE — REVIEW OF SYSTEMS
[Fever] : no fever [Chills] : no chills [Fatigue] : no fatigue [Night Sweats] : no night sweats [Recent Change In Weight] : ~T recent weight change [Pain] : no pain [Redness] : no redness [Vision Problems] : no vision problems [Itching] : no itching [Earache] : no earache [Nosebleed] : no nosebleeds [Nasal Discharge] : no nasal discharge [Sore Throat] : no sore throat [Postnasal Drip] : no postnasal drip [Chest Pain] : no chest pain [Palpitations] : no palpitations [Leg Claudication] : no leg claudication [Lower Ext Edema] : no lower extremity edema [Shortness Of Breath] : no shortness of breath [Wheezing] : no wheezing [Cough] : no cough [Dyspnea on Exertion] : no dyspnea on exertion [Abdominal Pain] : no abdominal pain [Nausea] : no nausea [Constipation] : no constipation [Diarrhea] : diarrhea [Vomiting] : no vomiting [Heartburn] : no heartburn [Melena] : no melena [Dysuria] : no dysuria [Incontinence] : incontinence [Hematuria] : no hematuria [Frequency] : no frequency [Joint Pain] : no joint pain [Muscle Pain] : no muscle pain [Back Pain] : no back pain [Itching] : no itching [Skin Rash] : no skin rash [Headache] : no headache [Dizziness] : no dizziness [Memory Loss] : no memory loss [Insomnia] : no insomnia [Easy Bleeding] : no easy bleeding [Easy Bruising] : no easy bruising [Swollen Glands] : no swollen glands [Negative] : Psychiatric [FreeTextEntry2] :   Weight loss.

## 2023-06-16 NOTE — HEALTH RISK ASSESSMENT
[Yes] : Yes [Monthly or less (1 pt)] : Monthly or less (1 point) [1 or 2 (0 pts)] : 1 or 2 (0 points) [Never (0 pts)] : Never (0 points) [No] : In the past 12 months have you used drugs other than those required for medical reasons? No [No falls in past year] : Patient reported no falls in the past year [0] : 1) Little interest or pleasure doing things: Not at all (0) [1] : 2) Feeling down, depressed, or hopeless for several days (1) [PHQ-2 Negative - No further assessment needed] : PHQ-2 Negative - No further assessment needed [de-identified] : Orthopedic, Dr Pritchett,  GYN  [de-identified] : Social [Audit-CScore] : 2 [de-identified] : no exercise  [de-identified] : Gluten free  [[id number]] : 1 [Never] : Never

## 2023-06-16 NOTE — HISTORY OF PRESENT ILLNESS
[FreeTextEntry8] : Mrs. ALAS is a 42 year  female, with a past medical history as noted below,who present to the office  today for evaluation for tongue discomfort.  States for over a week been having Tongue discomfort.    Patient states she is unaware what makes it better or worse.  Did increase her acid reflux medication without any relief.  Also noted a little sore at the tip of the tongue which has resolved.\par \par   Patient also states she followed up with hematology regarding elevated white blood cell count.  States she has a follow-up in 6 months just to monitor the CBC.\par \par   Patient also states she is doing well with her weight loss.    Taken Wegovy without any significant side effects.\par

## 2023-06-16 NOTE — PLAN
[FreeTextEntry1] : Heme hx leukocytosis -  follow-up with hematologist continue to monitor CBC.\par \par ENT    - Tongue discomfort rule out thrush.  Check culture.  Start CLotrimazole   Throat lozenges as directed.\par  call office next week for follow-up.   If no improvement follow-up with ENT.\par \par Gastroenterology.\par GERD. Continue with Dexilant  daily. Followup gastroenterology.  Continue gluten-free diet \par Advise diet. Avoid large meals and lying down after eating. \par History of Constipation   Increase fluids and fiber in diet \par  \par Endocrinology.\par Thyroid nodule. Followup with endocrinologist. States she had a recent sonogram from Dr. santiago office  showed no acute changes in her thyroid nodules and does not really require any other follow-up. \par \par Adult BMI screening and followup:  The patient's BMI is about 27 down from 30. Counseled patient regarding BMI, healthy eating, portion control and exercise importance of diet to maintain a healthy weight. \par Counseled patient on importance of exercise to maintain a healthy weight. Continue with wegovy.\par   Continue with\par \par Gynecology-advised to follow-up with gynecologist for routine Pap smear/pelvic exam.  Also advised yearly clinical breast exam as well as mammogram.  Advised self breast exam if notice any changes in breast tissue   she should have it evaluated by a medical provider. \par Advised Kegel exercises\par \par We discussed the importance of healthy lifestyles which include exercise, weight control and good diet.\par Patient's questions were answered in full detail. Advise patient if any other concerns arise to please call office to have a discussion. \par \par Patient  education  - COVID-19   Counseling and education provided to the patient.  Advised sign and symptoms of the virus.  Advised contact precautions.  Educated patient on proper hand washing and to participate in social distancing.  Advised to get covid booster  to cover the omicron variant\par \par Patient is in full awareness of the plan and agrees to it.  All pt question was answered.  \par \par I spent 30 Minutes with the patient, half of which we discussed finding on physical exam  and coordinated care.  As well as reviewed my plans and follow ups.\par Dragon speech recognition software was used to create portions of this document.  An attempt at proofreading has been made to minimize errors please call if any questions arise.

## 2023-06-16 NOTE — ASSESSMENT
[FreeTextEntry1] : Mrs. ALAS is a 42 year  female, with a past medical history as noted below,who present to the office  today  For weight check as well as tongue discomfort

## 2023-06-16 NOTE — COUNSELING
[Behavioral health counseling provided] : Behavioral health counseling provided [Engage in a relaxing activity] : Engage in a relaxing activity [AUDIT-C Screening administered and reviewed] : AUDIT-C Screening administered and reviewed [Benefits of weight loss discussed] : Benefits of weight loss discussed [Encouraged to increase physical activity] : Encouraged to increase physical activity [Good understanding] : Patient has a good understanding of disease, goals and obesity follow-up plan [FreeTextEntry2] : GLUTEN FREE DIET  [Weight management counseling provided] : Weight management [Healthy eating counseling provided] : healthy eating [Needs reinforcement, provided] : Patient needs reinforcement on understanding of disease, goals and obesity follow-up plan; reinforcement was provided [Low Fat Diet] : Low fat diet [Decrease Portions] : Decrease food portions [de-identified] : Diet and exercise advised

## 2023-06-16 NOTE — PHYSICAL EXAM
[No Acute Distress] : no acute distress [Well Nourished] : well nourished [Well Developed] : well developed [Well-Appearing] : well-appearing [Normal Sclera/Conjunctiva] : normal sclera/conjunctiva [PERRL] : pupils equal round and reactive to light [EOMI] : extraocular movements intact [Normal Outer Ear/Nose] : the outer ears and nose were normal in appearance [Normal Oropharynx] : the oropharynx was normal [Normal TMs] : both tympanic membranes were normal [Normal Nasal Mucosa] : the nasal mucosa was normal [No JVD] : no jugular venous distention [Supple] : supple [No Respiratory Distress] : no respiratory distress  [Clear to Auscultation] : lungs were clear to auscultation bilaterally [Normal Rate] : normal rate  [Regular Rhythm] : with a regular rhythm [Normal S1, S2] : normal S1 and S2 [No Carotid Bruits] : no carotid bruits [No Abdominal Bruit] : a ~M bruit was not heard ~T in the abdomen [Pedal Pulses Present] : the pedal pulses are present [No Edema] : there was no peripheral edema [No Extremity Clubbing/Cyanosis] : no extremity clubbing/cyanosis [No Palpable Aorta] : no palpable aorta [Soft] : abdomen soft [Non Tender] : non-tender [Non-distended] : non-distended [No Masses] : no abdominal mass palpated [No HSM] : no HSM [Normal Bowel Sounds] : normal bowel sounds [Obese] : obese [Striae] : striae were noted [Normal Posterior Cervical Nodes] : no posterior cervical lymphadenopathy [Normal Anterior Cervical Nodes] : no anterior cervical lymphadenopathy [No CVA Tenderness] : no CVA  tenderness [No Spinal Tenderness] : no spinal tenderness [No Joint Swelling] : no joint swelling [Grossly Normal Strength/Tone] : grossly normal strength/tone [No Rash] : no rash [Thin Hair] : thin hair [Normal Gait] : normal gait [Coordination Grossly Intact] : coordination grossly intact [No Focal Deficits] : no focal deficits [Deep Tendon Reflexes (DTR)] : deep tendon reflexes were 2+ and symmetric [Speech Grossly Normal] : speech grossly normal [Normal Affect] : the affect was normal [Alert and Oriented x3] : oriented to person, place, and time [Normal Mood] : the mood was normal [Normal Insight/Judgement] : insight and judgment were intact [de-identified] : Tongue discomfort..  No lymphadenopathy appreciated white coating on the tongue   [de-identified] : with murmur  [de-identified] :  as per gynecology [FreeTextEntry1] :  as per gynecology [de-identified] :  as per gynecology [de-identified] : multiple nevi on the back

## 2023-06-22 ENCOUNTER — APPOINTMENT (OUTPATIENT)
Dept: OTOLARYNGOLOGY | Facility: CLINIC | Age: 43
End: 2023-06-22
Payer: COMMERCIAL

## 2023-06-22 ENCOUNTER — NON-APPOINTMENT (OUTPATIENT)
Age: 43
End: 2023-06-22

## 2023-06-22 VITALS
HEART RATE: 97 BPM | HEIGHT: 68 IN | DIASTOLIC BLOOD PRESSURE: 81 MMHG | SYSTOLIC BLOOD PRESSURE: 110 MMHG | WEIGHT: 177 LBS | BODY MASS INDEX: 26.83 KG/M2

## 2023-06-22 DIAGNOSIS — K21.9 GASTRO-ESOPHAGEAL REFLUX DISEASE W/OUT ESOPHAGITIS: ICD-10-CM

## 2023-06-22 DIAGNOSIS — B37.0 CANDIDAL STOMATITIS: ICD-10-CM

## 2023-06-22 DIAGNOSIS — R07.0 PAIN IN THROAT: ICD-10-CM

## 2023-06-22 PROCEDURE — 31575 DIAGNOSTIC LARYNGOSCOPY: CPT

## 2023-06-22 PROCEDURE — 99204 OFFICE O/P NEW MOD 45 MIN: CPT | Mod: 25

## 2023-06-22 RX ORDER — COVID-19 MOLECULAR TEST ASSAY
KIT MISCELLANEOUS
Qty: 8 | Refills: 0 | Status: DISCONTINUED | COMMUNITY
Start: 2022-12-02 | End: 2023-06-22

## 2023-06-22 NOTE — ASSESSMENT
[FreeTextEntry1] : resume clotrimazole 5x/day for poss thrush \par \par Afrin for 3d, (unable to miguel sinus rinse), flonase\par rx biaxin\par \par f/u 2 weeks

## 2023-06-22 NOTE — HISTORY OF PRESENT ILLNESS
[de-identified] : 42 yr old female c/o 1 month hx of burning tongue\par no change with mouthwash\par PCP did tongue culture (results pending) and rx clotrimazole without improvement\par -cigs\par \par 6/16 had sore throat and odynophagia\par was unable to take clotrimazole 5x/day when sick\par her son was sick\par \par +GERD tx w Dexilant\par s/p PNS surgery 2006, much less sinusitis since then

## 2023-06-22 NOTE — PHYSICAL EXAM
[Normal] : mucosa is normal [Midline] : trachea located in midline position [de-identified] : prominent papilla and white coating

## 2023-06-22 NOTE — REVIEW OF SYSTEMS
[Seasonal Allergies] : seasonal allergies [Post Nasal Drip] : post nasal drip [Lightheadedness] : lightheadedness [Ear Noises] : ear noises [Nasal Congestion] : nasal congestion [Nose Bleeds] : nose bleeds [Recurrent Sinus Infections] : recurrent sinus infections [Problem Snoring] : problem snoring [Sinus Pressure] : sinus pressure [Hoarseness] : hoarseness [Throat Pain] : throat pain [Cough] : cough [Heartburn] : heartburn [Swollen Glands] : swollen glands [Negative] : Endocrine [FreeTextEntry7] : Difficulty swallowing

## 2023-07-05 ENCOUNTER — NON-APPOINTMENT (OUTPATIENT)
Age: 43
End: 2023-07-05

## 2023-07-06 ENCOUNTER — APPOINTMENT (OUTPATIENT)
Dept: OTOLARYNGOLOGY | Facility: CLINIC | Age: 43
End: 2023-07-06
Payer: COMMERCIAL

## 2023-07-06 VITALS
HEIGHT: 68 IN | DIASTOLIC BLOOD PRESSURE: 66 MMHG | WEIGHT: 177 LBS | HEART RATE: 88 BPM | SYSTOLIC BLOOD PRESSURE: 93 MMHG | BODY MASS INDEX: 26.83 KG/M2

## 2023-07-06 VITALS — SYSTOLIC BLOOD PRESSURE: 109 MMHG | HEART RATE: 80 BPM | DIASTOLIC BLOOD PRESSURE: 77 MMHG

## 2023-07-06 DIAGNOSIS — K14.6 GLOSSODYNIA: ICD-10-CM

## 2023-07-06 DIAGNOSIS — J01.90 ACUTE SINUSITIS, UNSPECIFIED: ICD-10-CM

## 2023-07-06 PROCEDURE — 99213 OFFICE O/P EST LOW 20 MIN: CPT

## 2023-07-06 RX ORDER — ALBUTEROL SULFATE 90 UG/1
108 (90 BASE) INHALANT RESPIRATORY (INHALATION)
Qty: 8 | Refills: 0 | Status: DISCONTINUED | COMMUNITY
Start: 2022-11-24 | End: 2023-07-06

## 2023-07-06 RX ORDER — CLARITHROMYCIN 500 MG/1
500 TABLET, FILM COATED ORAL
Qty: 20 | Refills: 0 | Status: DISCONTINUED | COMMUNITY
Start: 2023-06-22 | End: 2023-07-06

## 2023-07-06 RX ORDER — CLOTRIMAZOLE 10 MG/1
10 LOZENGE ORAL DAILY
Qty: 50 | Refills: 0 | Status: DISCONTINUED | COMMUNITY
Start: 2023-06-15 | End: 2023-07-06

## 2023-07-06 NOTE — HISTORY OF PRESENT ILLNESS
[de-identified] : 42 yr old female w burning tongue since late May, feels like she's getting better with less metallic taste and burning\par mouth C&S negative for fungus\par \par 6/22 dx sinusitis tx w Afrin for 3d, flonase and and biaxin.  unable to miguel Biaxin after a few days w GI upset.  feels better

## 2023-07-06 NOTE — ASSESSMENT
[FreeTextEntry1] : sinusitis resolved\par burning tongue improved\par f/u prn failure to continue to improve

## 2023-07-18 LAB — FUNGUS SPEC CULT ORG #8: NORMAL

## 2023-07-19 ENCOUNTER — NON-APPOINTMENT (OUTPATIENT)
Age: 43
End: 2023-07-19

## 2023-08-15 ENCOUNTER — RX RENEWAL (OUTPATIENT)
Age: 43
End: 2023-08-15

## 2023-09-25 ENCOUNTER — RX RENEWAL (OUTPATIENT)
Age: 43
End: 2023-09-25

## 2023-10-02 ENCOUNTER — APPOINTMENT (OUTPATIENT)
Dept: OBGYN | Facility: CLINIC | Age: 43
End: 2023-10-02
Payer: COMMERCIAL

## 2023-10-02 PROCEDURE — 99396 PREV VISIT EST AGE 40-64: CPT

## 2023-10-16 ENCOUNTER — APPOINTMENT (OUTPATIENT)
Age: 43
End: 2023-10-16
Payer: COMMERCIAL

## 2023-10-16 PROCEDURE — 99204 OFFICE O/P NEW MOD 45 MIN: CPT

## 2023-10-25 ENCOUNTER — RX RENEWAL (OUTPATIENT)
Age: 43
End: 2023-10-25

## 2023-11-15 ENCOUNTER — APPOINTMENT (OUTPATIENT)
Age: 43
End: 2023-11-15

## 2023-11-16 ENCOUNTER — NON-APPOINTMENT (OUTPATIENT)
Age: 43
End: 2023-11-16

## 2023-11-22 ENCOUNTER — NON-APPOINTMENT (OUTPATIENT)
Age: 43
End: 2023-11-22

## 2023-11-27 ENCOUNTER — RESULT CHARGE (OUTPATIENT)
Age: 43
End: 2023-11-27

## 2023-11-28 ENCOUNTER — APPOINTMENT (OUTPATIENT)
Dept: INTERNAL MEDICINE | Facility: CLINIC | Age: 43
End: 2023-11-28
Payer: COMMERCIAL

## 2023-11-28 VITALS
HEIGHT: 68 IN | TEMPERATURE: 96.1 F | BODY MASS INDEX: 24.91 KG/M2 | WEIGHT: 164.38 LBS | OXYGEN SATURATION: 98 % | DIASTOLIC BLOOD PRESSURE: 70 MMHG | SYSTOLIC BLOOD PRESSURE: 128 MMHG | HEART RATE: 89 BPM | RESPIRATION RATE: 16 BRPM

## 2023-11-28 DIAGNOSIS — Z00.00 ENCOUNTER FOR GENERAL ADULT MEDICAL EXAMINATION W/OUT ABNORMAL FINDINGS: ICD-10-CM

## 2023-11-28 DIAGNOSIS — U07.1 COVID-19: ICD-10-CM

## 2023-11-28 DIAGNOSIS — Z13.6 ENCOUNTER FOR SCREENING FOR CARDIOVASCULAR DISORDERS: ICD-10-CM

## 2023-11-28 PROCEDURE — 36415 COLL VENOUS BLD VENIPUNCTURE: CPT

## 2023-11-28 PROCEDURE — 93000 ELECTROCARDIOGRAM COMPLETE: CPT

## 2023-11-28 PROCEDURE — 99396 PREV VISIT EST AGE 40-64: CPT | Mod: 25

## 2023-11-28 PROCEDURE — 81003 URINALYSIS AUTO W/O SCOPE: CPT | Mod: QW

## 2023-11-28 RX ORDER — TRIAMCINOLONE ACETONIDE 1 MG/G
0.1 PASTE DENTAL 3 TIMES DAILY
Qty: 10 | Refills: 0 | Status: DISCONTINUED | COMMUNITY
Start: 2023-06-28 | End: 2023-11-28

## 2023-11-29 PROBLEM — U07.1 COVID-19 VIRUS DETECTED: Status: RESOLVED | Noted: 2022-12-01 | Resolved: 2023-11-29

## 2023-11-29 PROBLEM — Z13.6 SCREENING FOR HEART DISEASE: Status: ACTIVE | Noted: 2023-11-28

## 2023-12-04 ENCOUNTER — TRANSCRIPTION ENCOUNTER (OUTPATIENT)
Age: 43
End: 2023-12-04

## 2024-01-08 ENCOUNTER — RX RENEWAL (OUTPATIENT)
Age: 44
End: 2024-01-08

## 2024-01-31 ENCOUNTER — APPOINTMENT (OUTPATIENT)
Dept: OBGYN | Facility: CLINIC | Age: 44
End: 2024-01-31
Payer: COMMERCIAL

## 2024-01-31 PROCEDURE — 99213 OFFICE O/P EST LOW 20 MIN: CPT

## 2024-02-15 ENCOUNTER — RX RENEWAL (OUTPATIENT)
Age: 44
End: 2024-02-15

## 2024-03-17 ENCOUNTER — NON-APPOINTMENT (OUTPATIENT)
Age: 44
End: 2024-03-17

## 2024-05-14 ENCOUNTER — APPOINTMENT (OUTPATIENT)
Dept: INTERNAL MEDICINE | Facility: CLINIC | Age: 44
End: 2024-05-14
Payer: COMMERCIAL

## 2024-05-14 VITALS
SYSTOLIC BLOOD PRESSURE: 122 MMHG | WEIGHT: 162 LBS | TEMPERATURE: 97.9 F | HEART RATE: 75 BPM | HEIGHT: 68 IN | BODY MASS INDEX: 24.55 KG/M2 | RESPIRATION RATE: 16 BRPM | DIASTOLIC BLOOD PRESSURE: 78 MMHG | OXYGEN SATURATION: 98 %

## 2024-05-14 DIAGNOSIS — R74.8 ABNORMAL LEVELS OF OTHER SERUM ENZYMES: ICD-10-CM

## 2024-05-14 DIAGNOSIS — E04.1 NONTOXIC SINGLE THYROID NODULE: ICD-10-CM

## 2024-05-14 DIAGNOSIS — R94.31 ABNORMAL ELECTROCARDIOGRAM [ECG] [EKG]: ICD-10-CM

## 2024-05-14 DIAGNOSIS — R53.83 OTHER FATIGUE: ICD-10-CM

## 2024-05-14 DIAGNOSIS — E55.9 VITAMIN D DEFICIENCY, UNSPECIFIED: ICD-10-CM

## 2024-05-14 PROCEDURE — 36415 COLL VENOUS BLD VENIPUNCTURE: CPT

## 2024-05-14 PROCEDURE — G2211 COMPLEX E/M VISIT ADD ON: CPT

## 2024-05-14 PROCEDURE — 99214 OFFICE O/P EST MOD 30 MIN: CPT

## 2024-05-14 RX ORDER — SEMAGLUTIDE 1.7 MG/.75ML
1.7 INJECTION, SOLUTION SUBCUTANEOUS
Qty: 4 | Refills: 0 | Status: ACTIVE | COMMUNITY
Start: 2022-12-20 | End: 1900-01-01

## 2024-05-14 NOTE — PHYSICAL EXAM
[No Acute Distress] : no acute distress [Well Nourished] : well nourished [Well Developed] : well developed [Well-Appearing] : well-appearing [Normal Sclera/Conjunctiva] : normal sclera/conjunctiva [PERRL] : pupils equal round and reactive to light [EOMI] : extraocular movements intact [Normal Outer Ear/Nose] : the outer ears and nose were normal in appearance [Normal Oropharynx] : the oropharynx was normal [Normal TMs] : both tympanic membranes were normal [Normal Nasal Mucosa] : the nasal mucosa was normal [No JVD] : no jugular venous distention [Supple] : supple [No Lymphadenopathy] : no lymphadenopathy [No Respiratory Distress] : no respiratory distress  [Clear to Auscultation] : lungs were clear to auscultation bilaterally [Normal Rate] : normal rate  [Regular Rhythm] : with a regular rhythm [Normal S1, S2] : normal S1 and S2 [No Carotid Bruits] : no carotid bruits [No Abdominal Bruit] : a ~M bruit was not heard ~T in the abdomen [Pedal Pulses Present] : the pedal pulses are present [No Edema] : there was no peripheral edema [No Extremity Clubbing/Cyanosis] : no extremity clubbing/cyanosis [No Palpable Aorta] : no palpable aorta [Soft] : abdomen soft [Non Tender] : non-tender [Non-distended] : non-distended [No Masses] : no abdominal mass palpated [No HSM] : no HSM [Normal Bowel Sounds] : normal bowel sounds [Obese] : obese [Striae] : striae were noted [Normal Posterior Cervical Nodes] : no posterior cervical lymphadenopathy [Normal Anterior Cervical Nodes] : no anterior cervical lymphadenopathy [No CVA Tenderness] : no CVA  tenderness [No Spinal Tenderness] : no spinal tenderness [No Joint Swelling] : no joint swelling [Grossly Normal Strength/Tone] : grossly normal strength/tone [No Rash] : no rash [Thin Hair] : thin hair [Normal Gait] : normal gait [Coordination Grossly Intact] : coordination grossly intact [No Focal Deficits] : no focal deficits [Deep Tendon Reflexes (DTR)] : deep tendon reflexes were 2+ and symmetric [Speech Grossly Normal] : speech grossly normal [Normal Affect] : the affect was normal [Alert and Oriented x3] : oriented to person, place, and time [Normal Mood] : the mood was normal [Normal Insight/Judgement] : insight and judgment were intact [de-identified] : with murmur  [de-identified] :  as per gynecology [FreeTextEntry1] :  as per gynecology [de-identified] :  as per gynecology [de-identified] : multiple nevi on the back

## 2024-05-14 NOTE — PLAN
[FreeTextEntry1] : Gastroenterology. GERD. Continue with Dexilant daily. Follow-up gastroenterology.  Continue gluten-free diet  Advise diet. Avoid large meals and lying down after eating.  History of Constipation Recommend increasing fluids and fiber in diet.    Endocrinology. Thyroid nodule. Follow-up with endocrinologist. States she had a recent Examination from Dr. Walker's office.  Blood work and exam is stable.  Adult BMI screening and follow-up:  The patient's BMI is about 24 Counseled patients regarding BMI, healthy eating, portion control and exercise importance of diet to maintain a healthy weight.  Counseled patient on importance of exercise to maintain a healthy weight.  Discussed the use of  Ozempic for weight loss. Advised 1500 damaris diet  Allergies. Continue with over-the-counter antihistamine. Monitor labs.  Continue with Wegovy 1.7 mg weekly.  Discussed in a few months decreasing to 1 mg Refilled Wegovy 1.7 mg today.  Dermatology HARMEET was encouraged to wear sun protective clothing, sun block, and proper use of SPF board brim hats, to seek shade and to avoid the sun in peak hours.  ABCD of skin moles was advised.   Gynecology-advised to follow-up with gynecologist for routine Pap smear/pelvic exam.  Also advised yearly clinical breast exam as well as mammogram.  Advised self breast exam if notice any changes in breast tissue   she should have it evaluated by a medical provider.  We discussed the importance of healthy lifestyles which include exercise, weight control and good diet. Patient's questions were answered in full detail. Advise patient if any other concerns arise to please call office to have a discussion.  Hematology-elevated white blood cell count-repeat CBC today.  If still with persistent leukocytosis advised patient to follow back up with hematology  I spent 30  Minutes with the patient, half of which we discussed finding on physical exam and coordinated care.  As well as reviewed my plans and follow ups. Dragon speech recognition software was used to create portions of this document.  An attempt at proofreading has been made to minimize errors please call if any questions arise.

## 2024-05-14 NOTE — HISTORY OF PRESENT ILLNESS
[FreeTextEntry1] : FBW, weight check and medication renewal  [de-identified] : Ms. ALAS is a 43 year- female, with a past medical history as noted below, who present to the office today for medication renewal and fasting labs. Mildred denies having any adverse side effects to current medication regimen.  States her weight has been stable.  Would like to continue current dose of for now. Denies follow-up with hematology.  Also saw endocrinology as well as dermatology.  Had a repeat CBC still had elevated white blood cell count. States she has been exercising on regular basis at [employer]

## 2024-05-14 NOTE — ASSESSMENT
[FreeTextEntry1] : Ms. ALAS is a 89year female, with a past medical history as noted above, who present to the office today for medication renewal and FBW.  
Abdomen soft, non-tender, no guarding. left cva tenderness

## 2024-05-14 NOTE — COUNSELING
[Benefits of weight loss discussed] : Benefits of weight loss discussed [Encouraged to increase physical activity] : Encouraged to increase physical activity [Good understanding] : Patient has a good understanding of disease, goals and obesity follow-up plan [Weight management counseling provided] : Weight management [Healthy eating counseling provided] : healthy eating [Needs reinforcement, provided] : Patient needs reinforcement on understanding of disease, goals and obesity follow-up plan; reinforcement was provided [Low Fat Diet] : Low fat diet [Decrease Portions] : Decrease food portions [FreeTextEntry2] : GLUTEN FREE DIET  [de-identified] : Diet and exercise advised

## 2024-05-14 NOTE — HEALTH RISK ASSESSMENT
[Yes] : Yes [Monthly or less (1 pt)] : Monthly or less (1 point) [1 or 2 (0 pts)] : 1 or 2 (0 points) [Never (0 pts)] : Never (0 points) [No] : In the past 12 months have you used drugs other than those required for medical reasons? No [No falls in past year] : Patient reported no falls in the past year [0] : 1) Little interest or pleasure doing things: Not at all (0) [1] : 2) Feeling down, depressed, or hopeless for several days (1) [PHQ-2 Negative - No further assessment needed] : PHQ-2 Negative - No further assessment needed [Never] : Never [de-identified] : Orthopedic, Dr Pritchett,  GYN  [de-identified] : Social [Audit-CScore] : 2 [de-identified] : no exercise  [de-identified] : Gluten free  [[id number]] : 1

## 2024-05-14 NOTE — REVIEW OF SYSTEMS
[Fatigue] : fatigue [Recent Change In Weight] : ~T recent weight change [Vision Problems] : vision problems [Incontinence] : incontinence [Negative] : Neurological [Fever] : no fever [Chills] : no chills [Night Sweats] : no night sweats [Discharge] : no discharge [Pain] : no pain [Redness] : no redness [Itching] : no itching [Earache] : no earache [Nosebleed] : no nosebleeds [Nasal Discharge] : no nasal discharge [Sore Throat] : no sore throat [Postnasal Drip] : no postnasal drip [Chest Pain] : no chest pain [Palpitations] : no palpitations [Leg Claudication] : no leg claudication [Lower Ext Edema] : no lower extremity edema [Shortness Of Breath] : no shortness of breath [Wheezing] : no wheezing [Cough] : no cough [Dyspnea on Exertion] : no dyspnea on exertion [Abdominal Pain] : no abdominal pain [Nausea] : no nausea [Constipation] : no constipation [Diarrhea] : diarrhea [Vomiting] : no vomiting [Heartburn] : no heartburn [Melena] : no melena [Dysuria] : no dysuria [Hematuria] : no hematuria [Frequency] : no frequency [Joint Pain] : no joint pain [Muscle Pain] : no muscle pain [Back Pain] : no back pain [Itching] : no itching [Skin Rash] : no skin rash [Headache] : no headache [Dizziness] : no dizziness [Memory Loss] : no memory loss [Insomnia] : no insomnia [Easy Bleeding] : no easy bleeding [Easy Bruising] : no easy bruising [Swollen Glands] : no swollen glands [FreeTextEntry2] : weight loss  [FreeTextEntry9] : shoulder  [de-identified] : ysabel

## 2024-05-15 LAB
ALBUMIN SERPL ELPH-MCNC: 4.8 G/DL
ALP BLD-CCNC: 95 U/L
ALT SERPL-CCNC: 6 U/L
ANION GAP SERPL CALC-SCNC: 14 MMOL/L
AST SERPL-CCNC: 14 U/L
BASOPHILS # BLD AUTO: 0.06 K/UL
BASOPHILS NFR BLD AUTO: 0.6 %
BILIRUB SERPL-MCNC: 0.8 MG/DL
BUN SERPL-MCNC: 8 MG/DL
CALCIUM SERPL-MCNC: 10.1 MG/DL
CHLORIDE SERPL-SCNC: 101 MMOL/L
CHOLEST SERPL-MCNC: 155 MG/DL
CO2 SERPL-SCNC: 23 MMOL/L
CREAT SERPL-MCNC: 0.78 MG/DL
EGFR: 96 ML/MIN/1.73M2
EOSINOPHIL # BLD AUTO: 0.15 K/UL
EOSINOPHIL NFR BLD AUTO: 1.6 %
GLUCOSE SERPL-MCNC: 77 MG/DL
HCT VFR BLD CALC: 45.2 %
HDLC SERPL-MCNC: 50 MG/DL
HGB BLD-MCNC: 14.4 G/DL
IMM GRANULOCYTES NFR BLD AUTO: 0.2 %
LDLC SERPL CALC-MCNC: 81 MG/DL
LYMPHOCYTES # BLD AUTO: 2.93 K/UL
LYMPHOCYTES NFR BLD AUTO: 30.7 %
MAN DIFF?: NORMAL
MCHC RBC-ENTMCNC: 29 PG
MCHC RBC-ENTMCNC: 31.9 GM/DL
MCV RBC AUTO: 91.1 FL
MONOCYTES # BLD AUTO: 0.78 K/UL
MONOCYTES NFR BLD AUTO: 8.2 %
NEUTROPHILS # BLD AUTO: 5.6 K/UL
NEUTROPHILS NFR BLD AUTO: 58.7 %
NONHDLC SERPL-MCNC: 105 MG/DL
PLATELET # BLD AUTO: 366 K/UL
POTASSIUM SERPL-SCNC: 4.3 MMOL/L
PROT SERPL-MCNC: 7.1 G/DL
RBC # BLD: 4.96 M/UL
RBC # FLD: 12.8 %
SODIUM SERPL-SCNC: 139 MMOL/L
T3FREE SERPL-MCNC: 3.12 PG/ML
T4 FREE SERPL-MCNC: 1.5 NG/DL
TRIGL SERPL-MCNC: 135 MG/DL
TSH SERPL-ACNC: 0.94 UIU/ML
WBC # FLD AUTO: 9.54 K/UL

## 2024-08-12 ENCOUNTER — APPOINTMENT (OUTPATIENT)
Dept: ENDOCRINOLOGY | Facility: CLINIC | Age: 44
End: 2024-08-12
Payer: COMMERCIAL

## 2024-08-12 VITALS
HEART RATE: 85 BPM | HEIGHT: 68 IN | DIASTOLIC BLOOD PRESSURE: 77 MMHG | OXYGEN SATURATION: 99 % | SYSTOLIC BLOOD PRESSURE: 114 MMHG | WEIGHT: 162 LBS | BODY MASS INDEX: 24.55 KG/M2

## 2024-08-12 DIAGNOSIS — R53.83 OTHER FATIGUE: ICD-10-CM

## 2024-08-12 DIAGNOSIS — L65.9 NONSCARRING HAIR LOSS, UNSPECIFIED: ICD-10-CM

## 2024-08-12 PROCEDURE — 99204 OFFICE O/P NEW MOD 45 MIN: CPT

## 2024-08-12 NOTE — HISTORY OF PRESENT ILLNESS
[FreeTextEntry1] : Ms. HARMEET ALAS is a 43 year old female with a past medical history of hair loss, thyroid nodules presents for evaluation.  Hair loss started in high school. She had extensive work up. She was eventually diagnosed with PCOS. She had persistent hair thinning. She has had multiple pregnancies. She would grow hair and then eventually lose it. She was found to have thyroid nodules that have been monitored. She has had FNAs that are benign. She was started on Rogaine 6 months ago. It did not help.  She was found to have low DHEA-S and testosterone.  She does have a Mirena IUD and for at least 8 years.  It was changed after 5 years.  She is on Wegovy.  She has had a slow weight loss about 20 pounds in the last year.  She has never had a biopsy of her scalp.  She has seen numerous dermatologists.

## 2024-08-12 NOTE — ASSESSMENT
[FreeTextEntry1] :  43 year old female with a past medical history of hair loss, thyroid nodules presents for evaluation  1.  Hair loss Suspect patient has multiple etiologies of her hair loss However low androgen levels do not usually lead the hair loss in women And with PCOS you see higher levels Her androgens are likely suppressed by the Mirena Do not recommend to stop the Mirena I do not suspect her hair loss will improve Her pattern of hair loss is not hormonal Will check her hormones and vitamin levels The weight loss can also contribute to hair loss for at least the more recent acute hair loss If labs all negative then would recommend that she go back to her dermatologist to consider biopsy of her scalp

## 2024-08-23 LAB
17OHP SERPL-MCNC: 163 NG/DL
ACTH SER-ACNC: 19.5 PG/ML
DHEA-S SERPL-MCNC: 21.7 UG/DL
FERRITIN SERPL-MCNC: 110 NG/ML
FOLATE SERPL-MCNC: 4.1 NG/ML
IRON SATN MFR SERPL: 24 %
IRON SERPL-MCNC: 74 UG/DL
MONOMERIC PROLACTIN (ICMA)*: 14.6 NG/ML
PERCENT MACROPROLACTIN: 13 %
PROLACTIN, SERUM (ICMA)*: 16.8 NG/ML
TESTOST SERPL-MCNC: <2.5 NG/DL
TIBC SERPL-MCNC: 308 UG/DL
TSH SERPL-ACNC: 1 UIU/ML
UIBC SERPL-MCNC: 234 UG/DL
VIT A SERPL-MCNC: 40.4 UG/DL
VIT B12 SERPL-MCNC: 502 PG/ML
ZINC SERPL-MCNC: 76 UG/DL

## 2024-10-14 ENCOUNTER — APPOINTMENT (OUTPATIENT)
Dept: OBGYN | Facility: CLINIC | Age: 44
End: 2024-10-14
Payer: COMMERCIAL

## 2024-10-14 PROCEDURE — 99396 PREV VISIT EST AGE 40-64: CPT

## 2024-10-14 PROCEDURE — 99459 PELVIC EXAMINATION: CPT

## 2024-10-14 PROCEDURE — 36415 COLL VENOUS BLD VENIPUNCTURE: CPT

## 2024-10-14 PROCEDURE — 96127 BRIEF EMOTIONAL/BEHAV ASSMT: CPT

## 2024-11-11 ENCOUNTER — RX RENEWAL (OUTPATIENT)
Age: 44
End: 2024-11-11

## 2024-12-24 PROBLEM — F10.90 ALCOHOL USE: Status: ACTIVE | Noted: 2019-04-29

## 2025-01-23 ENCOUNTER — NON-APPOINTMENT (OUTPATIENT)
Age: 45
End: 2025-01-23

## 2025-01-23 ENCOUNTER — APPOINTMENT (OUTPATIENT)
Dept: INTERNAL MEDICINE | Facility: CLINIC | Age: 45
End: 2025-01-23
Payer: COMMERCIAL

## 2025-01-23 ENCOUNTER — RESULT CHARGE (OUTPATIENT)
Age: 45
End: 2025-01-23

## 2025-01-23 VITALS
WEIGHT: 165 LBS | BODY MASS INDEX: 25.01 KG/M2 | HEIGHT: 68 IN | RESPIRATION RATE: 16 BRPM | OXYGEN SATURATION: 98 % | HEART RATE: 72 BPM

## 2025-01-23 VITALS — TEMPERATURE: 97.9 F | DIASTOLIC BLOOD PRESSURE: 74 MMHG | SYSTOLIC BLOOD PRESSURE: 116 MMHG

## 2025-01-23 DIAGNOSIS — Z00.00 ENCOUNTER FOR GENERAL ADULT MEDICAL EXAMINATION W/OUT ABNORMAL FINDINGS: ICD-10-CM

## 2025-01-23 DIAGNOSIS — K21.9 GASTRO-ESOPHAGEAL REFLUX DISEASE W/OUT ESOPHAGITIS: ICD-10-CM

## 2025-01-23 DIAGNOSIS — R74.8 ABNORMAL LEVELS OF OTHER SERUM ENZYMES: ICD-10-CM

## 2025-01-23 DIAGNOSIS — E56.9 VITAMIN DEFICIENCY, UNSPECIFIED: ICD-10-CM

## 2025-01-23 DIAGNOSIS — E04.1 NONTOXIC SINGLE THYROID NODULE: ICD-10-CM

## 2025-01-23 DIAGNOSIS — L65.9 NONSCARRING HAIR LOSS, UNSPECIFIED: ICD-10-CM

## 2025-01-23 DIAGNOSIS — E55.9 VITAMIN D DEFICIENCY, UNSPECIFIED: ICD-10-CM

## 2025-01-23 LAB
BILIRUB UR QL STRIP: NEGATIVE
CLARITY UR: CLEAR
COLLECTION METHOD: NORMAL
GLUCOSE UR-MCNC: NEGATIVE
HCG UR QL: 0.2 EU/DL
HGB UR QL STRIP.AUTO: NEGATIVE
KETONES UR-MCNC: NEGATIVE
LEUKOCYTE ESTERASE UR QL STRIP: NEGATIVE
NITRITE UR QL STRIP: NEGATIVE
PH UR STRIP: 7.5
PROT UR STRIP-MCNC: ABNORMAL
SP GR UR STRIP: 1.02

## 2025-01-23 PROCEDURE — 99396 PREV VISIT EST AGE 40-64: CPT

## 2025-01-23 PROCEDURE — 93000 ELECTROCARDIOGRAM COMPLETE: CPT

## 2025-01-23 PROCEDURE — 81003 URINALYSIS AUTO W/O SCOPE: CPT | Mod: QW

## 2025-01-23 PROCEDURE — 36415 COLL VENOUS BLD VENIPUNCTURE: CPT

## 2025-01-23 RX ORDER — MINOXIDIL 2.5 MG/1
2.5 TABLET ORAL DAILY
Refills: 0 | Status: ACTIVE | COMMUNITY

## 2025-01-23 RX ORDER — SERTRALINE HYDROCHLORIDE 25 MG/1
25 TABLET, FILM COATED ORAL
Qty: 90 | Refills: 0 | Status: ACTIVE | COMMUNITY

## 2025-01-28 LAB
25(OH)D3 SERPL-MCNC: 23.1 NG/ML
ALBUMIN SERPL ELPH-MCNC: 4.6 G/DL
ALP BLD-CCNC: 86 U/L
ALT SERPL-CCNC: 9 U/L
ANION GAP SERPL CALC-SCNC: 11 MMOL/L
AST SERPL-CCNC: 13 U/L
BASOPHILS # BLD AUTO: 0.07 K/UL
BASOPHILS NFR BLD AUTO: 0.9 %
BILIRUB SERPL-MCNC: 1.8 MG/DL
BUN SERPL-MCNC: 9 MG/DL
CALCIUM SERPL-MCNC: 9.8 MG/DL
CHLORIDE SERPL-SCNC: 104 MMOL/L
CHOLEST SERPL-MCNC: 152 MG/DL
CO2 SERPL-SCNC: 27 MMOL/L
CREAT SERPL-MCNC: 0.76 MG/DL
EGFR: 98 ML/MIN/1.73M2
EOSINOPHIL # BLD AUTO: 0.11 K/UL
EOSINOPHIL NFR BLD AUTO: 1.4 %
GLUCOSE SERPL-MCNC: 92 MG/DL
HCT VFR BLD CALC: 45.8 %
HDLC SERPL-MCNC: 57 MG/DL
HGB BLD-MCNC: 14.2 G/DL
IMM GRANULOCYTES NFR BLD AUTO: 0.3 %
LDLC SERPL CALC-MCNC: 76 MG/DL
LYMPHOCYTES # BLD AUTO: 2.01 K/UL
LYMPHOCYTES NFR BLD AUTO: 25.4 %
MAN DIFF?: NORMAL
MCHC RBC-ENTMCNC: 29 PG
MCHC RBC-ENTMCNC: 31 G/DL
MCV RBC AUTO: 93.7 FL
MONOCYTES # BLD AUTO: 0.63 K/UL
MONOCYTES NFR BLD AUTO: 8 %
NEUTROPHILS # BLD AUTO: 5.08 K/UL
NEUTROPHILS NFR BLD AUTO: 64 %
NONHDLC SERPL-MCNC: 95 MG/DL
PLATELET # BLD AUTO: 338 K/UL
POTASSIUM SERPL-SCNC: 4.9 MMOL/L
PROT SERPL-MCNC: 7.1 G/DL
RBC # BLD: 4.89 M/UL
RBC # FLD: 13.2 %
SODIUM SERPL-SCNC: 142 MMOL/L
T4 FREE SERPL-MCNC: 1.3 NG/DL
TRIGL SERPL-MCNC: 104 MG/DL
TSH SERPL-ACNC: 0.84 UIU/ML
WBC # FLD AUTO: 7.92 K/UL

## 2025-02-22 ENCOUNTER — NON-APPOINTMENT (OUTPATIENT)
Age: 45
End: 2025-02-22

## 2025-03-06 ENCOUNTER — APPOINTMENT (OUTPATIENT)
Dept: INTERNAL MEDICINE | Facility: CLINIC | Age: 45
End: 2025-03-06

## 2025-03-06 ENCOUNTER — LABORATORY RESULT (OUTPATIENT)
Age: 45
End: 2025-03-06

## 2025-03-06 VITALS
TEMPERATURE: 98.1 F | BODY MASS INDEX: 25.01 KG/M2 | SYSTOLIC BLOOD PRESSURE: 120 MMHG | WEIGHT: 165 LBS | OXYGEN SATURATION: 97 % | DIASTOLIC BLOOD PRESSURE: 78 MMHG | HEART RATE: 71 BPM | HEIGHT: 68 IN

## 2025-03-06 DIAGNOSIS — K21.9 GASTRO-ESOPHAGEAL REFLUX DISEASE W/OUT ESOPHAGITIS: ICD-10-CM

## 2025-03-06 DIAGNOSIS — K31.7 POLYP OF STOMACH AND DUODENUM: ICD-10-CM

## 2025-03-06 PROCEDURE — 36415 COLL VENOUS BLD VENIPUNCTURE: CPT

## 2025-03-06 PROCEDURE — G2211 COMPLEX E/M VISIT ADD ON: CPT | Mod: NC

## 2025-03-06 PROCEDURE — 99214 OFFICE O/P EST MOD 30 MIN: CPT

## 2025-03-11 LAB
ALBUMIN SERPL ELPH-MCNC: 4.6 G/DL
ALP BLD-CCNC: 85 U/L
ALT SERPL-CCNC: 6 U/L
ANION GAP SERPL CALC-SCNC: 15 MMOL/L
AST SERPL-CCNC: 12 U/L
BASOPHILS # BLD AUTO: 0.07 K/UL
BASOPHILS NFR BLD AUTO: 0.8 %
BILIRUB SERPL-MCNC: 1.1 MG/DL
BUN SERPL-MCNC: 13 MG/DL
CALCIUM SERPL-MCNC: 9.8 MG/DL
CHLORIDE SERPL-SCNC: 103 MMOL/L
CO2 SERPL-SCNC: 22 MMOL/L
CREAT SERPL-MCNC: 0.7 MG/DL
EGFRCR SERPLBLD CKD-EPI 2021: 109 ML/MIN/1.73M2
EOSINOPHIL # BLD AUTO: 0.13 K/UL
EOSINOPHIL NFR BLD AUTO: 1.5 %
FOLATE SERPL-MCNC: 15.2 NG/ML
GLUCOSE SERPL-MCNC: 84 MG/DL
HCT VFR BLD CALC: 43 %
HGB BLD-MCNC: 13.3 G/DL
IMM GRANULOCYTES NFR BLD AUTO: 0.6 %
LYMPHOCYTES # BLD AUTO: 2.42 K/UL
LYMPHOCYTES NFR BLD AUTO: 27.7 %
MAN DIFF?: NORMAL
MCHC RBC-ENTMCNC: 28.7 PG
MCHC RBC-ENTMCNC: 30.9 G/DL
MCV RBC AUTO: 92.7 FL
MONOCYTES # BLD AUTO: 0.84 K/UL
MONOCYTES NFR BLD AUTO: 9.6 %
NEUTROPHILS # BLD AUTO: 5.23 K/UL
NEUTROPHILS NFR BLD AUTO: 59.8 %
PLATELET # BLD AUTO: 351 K/UL
POTASSIUM SERPL-SCNC: 5.1 MMOL/L
PROT SERPL-MCNC: 7.2 G/DL
RBC # BLD: 4.64 M/UL
RBC # FLD: 13.2 %
SODIUM SERPL-SCNC: 139 MMOL/L
TSH SERPL-ACNC: 0.78 UIU/ML
WBC # FLD AUTO: 8.74 K/UL

## 2025-04-11 PROBLEM — Z20.822 ENCOUNTER FOR LABORATORY TESTING FOR COVID-19 VIRUS: Status: RESOLVED | Noted: 2020-07-17 | Resolved: 2025-04-11

## 2025-05-20 ENCOUNTER — TRANSCRIPTION ENCOUNTER (OUTPATIENT)
Age: 45
End: 2025-05-20

## 2025-05-22 ENCOUNTER — APPOINTMENT (OUTPATIENT)
Dept: INTERNAL MEDICINE | Facility: CLINIC | Age: 45
End: 2025-05-22
Payer: COMMERCIAL

## 2025-05-22 ENCOUNTER — NON-APPOINTMENT (OUTPATIENT)
Age: 45
End: 2025-05-22

## 2025-05-22 VITALS — WEIGHT: 175 LBS | BODY MASS INDEX: 26.61 KG/M2

## 2025-05-22 DIAGNOSIS — J30.9 ALLERGIC RHINITIS, UNSPECIFIED: ICD-10-CM

## 2025-05-22 DIAGNOSIS — R63.5 ABNORMAL WEIGHT GAIN: ICD-10-CM

## 2025-05-22 PROCEDURE — G2211 COMPLEX E/M VISIT ADD ON: CPT | Mod: NC,95

## 2025-05-22 PROCEDURE — 99213 OFFICE O/P EST LOW 20 MIN: CPT | Mod: 95

## 2025-06-30 ENCOUNTER — TRANSCRIPTION ENCOUNTER (OUTPATIENT)
Age: 45
End: 2025-06-30

## 2025-07-10 ENCOUNTER — APPOINTMENT (OUTPATIENT)
Dept: INTERNAL MEDICINE | Facility: CLINIC | Age: 45
End: 2025-07-10
Payer: COMMERCIAL

## 2025-07-10 VITALS — HEIGHT: 68 IN | BODY MASS INDEX: 26.83 KG/M2 | WEIGHT: 177 LBS

## 2025-07-10 PROCEDURE — 99213 OFFICE O/P EST LOW 20 MIN: CPT | Mod: 95

## 2025-07-10 PROCEDURE — G2211 COMPLEX E/M VISIT ADD ON: CPT | Mod: NC,95

## 2025-09-02 ENCOUNTER — RX RENEWAL (OUTPATIENT)
Age: 45
End: 2025-09-02